# Patient Record
Sex: FEMALE | Race: WHITE | NOT HISPANIC OR LATINO | Employment: UNEMPLOYED | URBAN - NONMETROPOLITAN AREA
[De-identification: names, ages, dates, MRNs, and addresses within clinical notes are randomized per-mention and may not be internally consistent; named-entity substitution may affect disease eponyms.]

---

## 2018-09-06 ENCOUNTER — TRANSCRIBE ORDERS (OUTPATIENT)
Dept: ADMINISTRATIVE | Facility: HOSPITAL | Age: 78
End: 2018-09-06

## 2018-09-06 DIAGNOSIS — R13.11 DYSPHAGIA, ORAL PHASE: Primary | ICD-10-CM

## 2018-09-12 ENCOUNTER — HOSPITAL ENCOUNTER (OUTPATIENT)
Dept: GENERAL RADIOLOGY | Facility: HOSPITAL | Age: 78
Discharge: HOME | End: 2018-09-12
Attending: OTOLARYNGOLOGY | Admitting: OTOLARYNGOLOGY

## 2018-09-12 DIAGNOSIS — R13.11 DYSPHAGIA, ORAL PHASE: ICD-10-CM

## 2018-09-12 PROCEDURE — G8996 SWALLOW CURRENT STATUS: HCPCS

## 2018-09-12 PROCEDURE — G8997 SWALLOW GOAL STATUS: HCPCS

## 2018-09-12 PROCEDURE — 74230 X-RAY XM SWLNG FUNCJ C+: CPT

## 2018-09-12 PROCEDURE — 74230 X-RAY XM SWLNG FUNCJ C+: CPT | Performed by: RADIOLOGY

## 2018-09-12 PROCEDURE — G8998 SWALLOW D/C STATUS: HCPCS

## 2018-09-12 PROCEDURE — 92611 MOTION FLUOROSCOPY/SWALLOW: CPT

## 2018-09-12 NOTE — MBS/VFSS/FEES
Outpatient Speech Language Pathology   Adult Swallow Initial Evaluation   Ryan   OUTPATIENT MODIFIED BARIUM SWALLOW STUDY     Patient Name: Amanda Cruz  : 1940  MRN: [id number]  Today's Date: 2018       Visit Date: 2018   There is no problem list on file for this patient.     History reviewed. No pertinent past medical history.     No past surgical history on file.    Visit Dx:     ICD-10-CM ICD-9-CM   1. Dysphagia, oral phase R13.11 787.21     Amanda Cruz  presents to the radiology suite this am from her private residence to participate in an instrumental MBS to evaluate safety/efficacy of swallowing fnx, determine safest/least restrictive diet. She is accompanied by a friend/family member whom remains in the waiting area during this evaluation. Ms. Cruz reports globus sensation vs. retrosternal discomfort w/ po intake, particularly solids foods/meats. She states these occurrences are sometimes so severe that she elicits regurgitation to alleviate this. She indicates initial onset of this approximately  w/ recent progression of these events. She denies any recent h/o PNA/bronchitis.     PMH is significant for fall in  resulting in foot/leg fx s/p repair w/ subsequent loss of smell/taste associated w/ this fall as well. PMH is also significant for UTI 2018, DM, HTN, thyroid disease, anxiety, SAMUEL, hiatal hernia, s/p partial hysterectomy. She does report sporadic occurrence of oral lesions/sore throat, not evidenced today.     Social History: No known h/o tobacco, alcohol or illicit drug use.     Current diet of regular consistency, thin liquids.     No recent chest xray available for review.     Pt is observed on ra w/o complications.     Risks and benefits of the procedure are explained w/ pt verbalizing understanding/agreement to participate.     Pt is positioned upright and centered in a chair to accept multiple po presentations of solid cracker, puree, honey  thick, nectar thick, and thin liquids via spoon, cup and straw, along w/ whole placebo pill in puree. Pt is able to self feed.     All views are from the lateral plane.     Facial/oral structures are symmetrical upon observation w/o lingual deviation upon protrusion. Oral mucosa are moist, pink and clean. Secretions are clear, thin and well controlled. OROM/NATHALIE is wfl to imitate oral postures. She is edentulous w/ upper/lower dentures in place. Gag is not assessed. Volitional cough is adequate in intensity, clear in quality, nonproductive. Vocal quality is adequate in intensity, clear in quality w/ intelligible speech. Pt is a/a and cooperative to particpate. She is oriented to person, place, and time, follows simple directives, and participates in simple conversational exchanges.     Upon po presentations, adequate bolus anticipation w/ good labial seal for bolus clearance via spoon bowl, cup rim stability and suction via straw. Bolus formation, manipulation, and control are wfl w/ rotary mastication pattern. A-p transit is timely w/o oral residue. Tongue base retraction and linguavelar seal are adequate w/o premature spillage. No laryngeal penetration or aspiration is evidenced before the swallow.     Pharyngeal swallow is timely w/ adequate hyolaryngeal elevation and epiglottic inversion. Pharyngeal contraction is adequate w/o significant residue. No laryngeal penetration or aspiration evidenced during or after the swallow.     Pt is able to manipulate and swallow whole placebo pill in puree w/o difficulty, however, pill does temporarily lodge in the upper esophagus, clearing w/ consecutive puree presentation.     D/w radiologist in person findings of full esophageal sweep. Per this discussion, imaging reveals per radiologist a focal area of residue at C5-6 level for all consistencies w/ uncertain etiology uncertain, as well as a mild narrowing. Radiologist voices possibility of a lateral diverticulum vs.  esophageal stricture and recommends GI f/u. Motility otherwise appears adequate w/o obvious retrograde flow noted.      Impression: Per this evaluation, Ms. Monsivaisglia presents w/ wfl oropharyngeal swallow w/o laryngeal penetration or aspiration evidenced across this evaluation. Please consider pt candidacy for GI referral per findings of esophageal sweep and radiologist recommendation. See radiologist note for further details.     Recommendations:   1. Regular consistency diet, thin liquids.   2. Meds whole in puree/thins.   3. Small bites and sips.   4. Increased oral prep time.   5. Alternate solids and liquids.   6. SAMUEL precautions.   7. Oral care protocol.   8. Please consider pt candidacy for GI referral.     D/w pt at length results and recommendations w/ review of MBS images/videos w/ verbal understanding and agreement.     Thank you for allowing me to participate in the care of your patient-  Mari Maher M.A., CCC-SLP          OP SLP Education     Row Name 09/12/18 1056       Education    Barriers to Learning No barriers identified  -CM    Education Provided Described results of evaluation;Patient expressed understanding of evaluation  -CM    Assessed Learning motivation  -CM    Learning Motivation Strong  -CM    Learning Method Explanation  -CM    Teaching Response Verbalized understanding  -CM      User Key  (r) = Recorded By, (t) = Taken By, (c) = Cosigned By    Initials Name Effective Dates    CM Mari Maher MA,CCC-SLP 04/03/18 -         Time Calculation:      Therapy Charges for Today     Code Description Service Date Service Provider Modifiers Qty    81439769728 HC ST SWALLOWING CURRENT STATUS 9/12/2018 Mari Maher MA,CCC-SLP GN, [facility]    17253270394 HC ST SWALLOWING PROJECTED 9/12/2018 Mari Maher MA,CCC-SLP GN,  1    43362337017 HC ST SWALLOWING DISCHARGE 9/12/2018 Mari Maher MA,CCC-SLP GN,  1    32046876973 HC ST MOTION FLUORO EVAL  SWALLOW 8 9/12/2018 Mari Maher MA,CCC-SLP GN 1        SLP G-Codes  SLP NOMS Used?: Yes  Functional Limitations: Swallowing  Swallow Current Status (): 0 percent impaired, limited or restricted  Swallow Goal Status (): 0 percent impaired, limited or restricted  Swallow Discharge Status (): 0 percent impaired, limited or restricted        Mari Maher MA,CCC-SLP  9/12/2018

## 2018-09-13 ENCOUNTER — APPOINTMENT (OUTPATIENT)
Dept: GENERAL RADIOLOGY | Facility: HOSPITAL | Age: 78
End: 2018-09-13
Attending: OTOLARYNGOLOGY

## 2018-10-09 ENCOUNTER — OFFICE VISIT (OUTPATIENT)
Dept: GASTROENTEROLOGY | Facility: CLINIC | Age: 78
End: 2018-10-09

## 2018-10-09 VITALS
HEIGHT: 64 IN | SYSTOLIC BLOOD PRESSURE: 150 MMHG | BODY MASS INDEX: 25.81 KG/M2 | HEART RATE: 48 BPM | DIASTOLIC BLOOD PRESSURE: 62 MMHG | WEIGHT: 151.2 LBS | OXYGEN SATURATION: 97 %

## 2018-10-09 DIAGNOSIS — K21.9 GASTROESOPHAGEAL REFLUX DISEASE, ESOPHAGITIS PRESENCE NOT SPECIFIED: Primary | ICD-10-CM

## 2018-10-09 DIAGNOSIS — R13.10 ABNORMAL SWALLOWING: ICD-10-CM

## 2018-10-09 DIAGNOSIS — L98.9 SKIN LESION: ICD-10-CM

## 2018-10-09 DIAGNOSIS — R13.10 DYSPHAGIA, UNSPECIFIED TYPE: ICD-10-CM

## 2018-10-09 PROCEDURE — 99204 OFFICE O/P NEW MOD 45 MIN: CPT | Performed by: PHYSICIAN ASSISTANT

## 2018-10-09 RX ORDER — FLUTICASONE PROPIONATE 50 MCG
1 SPRAY, SUSPENSION (ML) NASAL 2 TIMES DAILY
COMMUNITY

## 2018-10-09 RX ORDER — ASPIRIN 81 MG/1
81 TABLET ORAL DAILY
COMMUNITY

## 2018-10-09 RX ORDER — GLIPIZIDE 5 MG/1
5 TABLET ORAL DAILY
COMMUNITY

## 2018-10-09 RX ORDER — PAROXETINE 10 MG/1
10 TABLET, FILM COATED ORAL EVERY MORNING
COMMUNITY

## 2018-10-09 RX ORDER — PANTOPRAZOLE SODIUM 40 MG/1
40 TABLET, DELAYED RELEASE ORAL DAILY
Qty: 30 TABLET | Refills: 1 | Status: SHIPPED | OUTPATIENT
Start: 2018-10-09 | End: 2018-11-20 | Stop reason: SDUPTHER

## 2018-10-09 RX ORDER — CLONAZEPAM 0.5 MG/1
0.5 TABLET ORAL DAILY
COMMUNITY
End: 2019-11-21

## 2018-10-09 RX ORDER — ATORVASTATIN CALCIUM 10 MG/1
10 TABLET, FILM COATED ORAL DAILY
COMMUNITY

## 2018-10-09 RX ORDER — LEVOTHYROXINE SODIUM 0.07 MG/1
75 TABLET ORAL DAILY
COMMUNITY

## 2018-10-09 RX ORDER — RANITIDINE 150 MG/1
150 TABLET ORAL DAILY
COMMUNITY

## 2018-10-09 NOTE — PROGRESS NOTES
": 1940    Chief Complaint   Patient presents with   • Difficulty Swallowing       Amanda Cruz is a 77 y.o. female who presents to the office today as a new patient for evaluation of Difficulty Swallowing.    History of Present Illness:  She has had loss of smell and taste since the . She had been seeing ENT regarding this problem. She had swallow study which had abnormal findings per her report. She has noticed that she has difficulty with swallowing and points to the bottom of her neck as the area of the discomfort. She also feels that at times, things are \"stuck\" there and points to her mid-chest. She has difficulty with chips, meats and breads. She has had dysphagia which has gradually worsened and became more frequent with regurgitation and even vomiting over the past 5-6 months. She takes Zantac 150 mg once daily. Heartburn is occassional. Nausea is intermittent. No first degree family history of stomach or esophageal cancer. Maternal uncle had esophageal cancer. Last colonoscopy was  which was normal performed by Dr. Santos. No prior history of EGD.     Has skin lesions that she is worried about, 1 on neck, 1 on right side, 1 on back and others that she would like checked. No history of heart murmur. Reports fatigue and \"feels tired in chest.\"    2018 swallow eval:  FINDINGS: There was no penetration or aspiration during the study.  Patient protected her airway adequately.  Focal area of bullous retention at C5-6 level. Etiology is uncertain.  This could represent a lateral diverticulum. Mild narrowing also noted.  I would consider GI follow-up.  FLUOROSCOPY TIME: 1.7 minutes.  IMAGES: Cine loop was acquired.  IMPRESSION:  No evidence of aspiration.    Review of Systems   Constitutional: Positive for chills, fatigue and fever.   HENT: Positive for trouble swallowing.    Eyes: Negative.    Respiratory: Positive for cough and choking. Negative for chest tightness and shortness of " breath.    Cardiovascular: Negative for chest pain.   Gastrointestinal: Positive for abdominal distention, nausea and vomiting. Negative for abdominal pain, anal bleeding, blood in stool, constipation and diarrhea.   Endocrine: Negative.    Genitourinary: Positive for frequency and urgency.   Musculoskeletal: Positive for back pain.   Skin: Negative for color change, rash and wound.   Allergic/Immunologic: Negative for environmental allergies and food allergies.   Neurological: Positive for dizziness and light-headedness. Negative for headaches.   Hematological: Does not bruise/bleed easily.   Psychiatric/Behavioral: The patient is nervous/anxious.        Past Medical History:   Diagnosis Date   • Diabetes mellitus (CMS/HCC)    • Hyperlipidemia    • Smell and taste disorder        Past Surgical History:   Procedure Laterality Date   • ANKLE SURGERY     • COLONOSCOPY     • HYSTERECTOMY      partial       Family History   Problem Relation Age of Onset   • Cancer Mother    • Alzheimer's disease Mother    • Uterine cancer Mother    • Uterine cancer Maternal Grandmother        Social History     Social History   • Marital status:      Social History Main Topics   • Smoking status: Never Smoker   • Smokeless tobacco: Never Used   • Alcohol use No   • Drug use: No   • Sexual activity: Defer     Other Topics Concern   • Not on file       Current Outpatient Prescriptions:   •  aspirin 81 MG EC tablet, Take 81 mg by mouth Daily., Disp: , Rfl:   •  atorvastatin (LIPITOR) 10 MG tablet, Take 10 mg by mouth Daily., Disp: , Rfl:   •  clonazePAM (KlonoPIN) 0.5 MG tablet, Take 0.5 mg by mouth Daily., Disp: , Rfl:   •  fluticasone (FLONASE) 50 MCG/ACT nasal spray, 1 spray into the nostril(s) as directed by provider 2 (Two) Times a Day., Disp: , Rfl:   •  glipiZIDE (GLUCOTROL) 5 MG tablet, Take 5 mg by mouth Daily., Disp: , Rfl:   •  levothyroxine (SYNTHROID, LEVOTHROID) 75 MCG tablet, Take 75 mcg by mouth Daily., Disp: , Rfl:  "  •  PARoxetine (PAXIL) 10 MG tablet, Take 10 mg by mouth Every Morning., Disp: , Rfl:   •  raNITIdine (ZANTAC) 150 MG tablet, Take 150 mg by mouth Daily., Disp: , Rfl:     Allergies:   Rocephin [ceftriaxone]    Vitals:  /62 (BP Location: Right arm, Patient Position: Sitting, Cuff Size: Adult)   Pulse (!) 48   Ht 162.6 cm (64\")   Wt 68.6 kg (151 lb 3.2 oz)   SpO2 97%   BMI 25.95 kg/m²     Physical Exam   Constitutional: She is oriented to person, place, and time. She appears well-developed and well-nourished. No distress.   HENT:   Head: Normocephalic and atraumatic.   Nose: Nose normal.   Mouth/Throat: Oropharynx is clear and moist.   Eyes: Conjunctivae are normal. Right eye exhibits no discharge. Left eye exhibits no discharge. No scleral icterus.   Neck: Normal range of motion. No JVD present.   Cardiovascular: Normal rate and regular rhythm.  Exam reveals no gallop and no friction rub.    Murmur heard.  Pulmonary/Chest: Effort normal and breath sounds normal. No respiratory distress. She has no wheezes. She has no rales. She exhibits no tenderness.   Abdominal: Soft. Bowel sounds are normal. She exhibits no mass. There is no tenderness.   Musculoskeletal: Normal range of motion. She exhibits no edema or deformity.   Neurological: She is alert and oriented to person, place, and time. Coordination normal.   Skin: Skin is warm and dry. No rash noted. She is not diaphoretic. No erythema.   Seborrheic keratosis right thoracic region   Psychiatric: She has a normal mood and affect. Her behavior is normal. Judgment and thought content normal.   Vitals reviewed.      Assessment/Plan:  1. Gastroesophageal reflux disease, esophagitis presence not specified    2. Dysphagia, unspecified type    3. Abnormal swallowing    4. Skin lesion      Orders Placed This Encounter   Procedures   • Ambulatory Referral to Dermatology   • Follow Anesthesia Guidelines / Standing Orders     ESOPHAGOGASTRODUODENOSCOPY WITH BIOPSY " CPT CODE: 01433 (N/A)  She will need an esophagogastroduodenoscopy performed with IV general sedation. All of the risks, benefits and alternatives of this procedure have been discussed with her, all of her questions have been answered and she has elected to proceed. She should follow up in the office after this procedure to discuss the results and further recommendations can be made at that time.    New Medications Ordered This Visit   Medications   • pantoprazole (PROTONIX) 40 MG EC tablet     Sig: Take 1 tablet by mouth Daily.     Dispense:  30 tablet     Refill:  1     I have asked her to start taking a PPI (Protonix 40 mg once daily) as an effort to decrease episodes of dysphagia if related to GERD. She will have EGD for further evaluation of abnormalities mentioned on previous swallow study. Narrowing was mentioned as well as possible esophageal diverticulum.     Referral to dermatology was placed due to patient's concern for skin lesions. She will also consider cardiac evaluation due to new heart murmur and complaints of fatigue. She plans to consider this then discuss with PCP.         Return for follow up after procedure to discuss results.      Electronically signed 10/9/2018 12:32 PM  Jania Lucero PA-C, [facility]

## 2018-10-15 PROBLEM — R13.10 ABNORMAL SWALLOWING: Status: ACTIVE | Noted: 2018-10-15

## 2018-10-15 PROBLEM — K21.9 GASTROESOPHAGEAL REFLUX DISEASE: Status: ACTIVE | Noted: 2018-10-15

## 2018-10-15 PROBLEM — R13.10 DYSPHAGIA: Status: ACTIVE | Noted: 2018-10-15

## 2018-10-24 ENCOUNTER — ANESTHESIA (OUTPATIENT)
Dept: PERIOP | Facility: HOSPITAL | Age: 78
End: 2018-10-24

## 2018-10-24 ENCOUNTER — HOSPITAL ENCOUNTER (OUTPATIENT)
Facility: HOSPITAL | Age: 78
Setting detail: HOSPITAL OUTPATIENT SURGERY
Discharge: HOME | End: 2018-10-24
Attending: SURGERY | Admitting: ANESTHESIOLOGY

## 2018-10-24 ENCOUNTER — ANESTHESIA EVENT (OUTPATIENT)
Dept: PERIOP | Facility: HOSPITAL | Age: 78
End: 2018-10-24

## 2018-10-24 VITALS
WEIGHT: 153 LBS | HEART RATE: 50 BPM | RESPIRATION RATE: 18 BRPM | TEMPERATURE: 98.2 F | OXYGEN SATURATION: 97 % | BODY MASS INDEX: 25.49 KG/M2 | DIASTOLIC BLOOD PRESSURE: 64 MMHG | HEIGHT: 65 IN | SYSTOLIC BLOOD PRESSURE: 136 MMHG

## 2018-10-24 DIAGNOSIS — R13.10 ABNORMAL SWALLOWING: ICD-10-CM

## 2018-10-24 DIAGNOSIS — R13.10 DYSPHAGIA, UNSPECIFIED TYPE: ICD-10-CM

## 2018-10-24 DIAGNOSIS — K21.9 GASTROESOPHAGEAL REFLUX DISEASE, ESOPHAGITIS PRESENCE NOT SPECIFIED: ICD-10-CM

## 2018-10-24 LAB — GLUCOSE BLDC GLUCOMTR-MCNC: 123 MG/DL (ref 70–130)

## 2018-10-24 PROCEDURE — 82962 GLUCOSE BLOOD TEST: CPT

## 2018-10-24 PROCEDURE — 43239 EGD BIOPSY SINGLE/MULTIPLE: CPT | Performed by: SURGERY

## 2018-10-24 PROCEDURE — 25010000002 PROPOFOL 10 MG/ML EMULSION: Performed by: NURSE ANESTHETIST, CERTIFIED REGISTERED

## 2018-10-24 PROCEDURE — 88305 TISSUE EXAM BY PATHOLOGIST: CPT | Performed by: SURGERY

## 2018-10-24 RX ORDER — SODIUM CHLORIDE 0.9 % (FLUSH) 0.9 %
3-10 SYRINGE (ML) INJECTION AS NEEDED
Status: DISCONTINUED | OUTPATIENT
Start: 2018-10-24 | End: 2018-10-24 | Stop reason: HOSPADM

## 2018-10-24 RX ORDER — ONDANSETRON 2 MG/ML
4 INJECTION INTRAMUSCULAR; INTRAVENOUS ONCE AS NEEDED
Status: DISCONTINUED | OUTPATIENT
Start: 2018-10-24 | End: 2018-10-24 | Stop reason: HOSPADM

## 2018-10-24 RX ORDER — SODIUM CHLORIDE 0.9 % (FLUSH) 0.9 %
3 SYRINGE (ML) INJECTION EVERY 12 HOURS SCHEDULED
Status: DISCONTINUED | OUTPATIENT
Start: 2018-10-24 | End: 2018-10-24 | Stop reason: HOSPADM

## 2018-10-24 RX ORDER — SULFAMETHOXAZOLE AND TRIMETHOPRIM 800; 160 MG/1; MG/1
1 TABLET ORAL 2 TIMES DAILY
COMMUNITY
End: 2019-11-21

## 2018-10-24 RX ORDER — PROPOFOL 10 MG/ML
VIAL (ML) INTRAVENOUS AS NEEDED
Status: DISCONTINUED | OUTPATIENT
Start: 2018-10-24 | End: 2018-10-24 | Stop reason: SURG

## 2018-10-24 RX ORDER — LOSARTAN POTASSIUM 100 MG/1
100 TABLET ORAL DAILY
COMMUNITY

## 2018-10-24 RX ORDER — PREDNISONE 10 MG/1
10 TABLET ORAL DAILY
COMMUNITY
End: 2018-11-20

## 2018-10-24 RX ORDER — SODIUM CHLORIDE, SODIUM LACTATE, POTASSIUM CHLORIDE, CALCIUM CHLORIDE 600; 310; 30; 20 MG/100ML; MG/100ML; MG/100ML; MG/100ML
125 INJECTION, SOLUTION INTRAVENOUS CONTINUOUS
Status: DISCONTINUED | OUTPATIENT
Start: 2018-10-24 | End: 2018-10-24 | Stop reason: HOSPADM

## 2018-10-24 RX ORDER — IPRATROPIUM BROMIDE AND ALBUTEROL SULFATE 2.5; .5 MG/3ML; MG/3ML
3 SOLUTION RESPIRATORY (INHALATION) ONCE AS NEEDED
Status: DISCONTINUED | OUTPATIENT
Start: 2018-10-24 | End: 2018-10-24 | Stop reason: HOSPADM

## 2018-10-24 RX ADMIN — SODIUM CHLORIDE, POTASSIUM CHLORIDE, SODIUM LACTATE AND CALCIUM CHLORIDE: 600; 310; 30; 20 INJECTION, SOLUTION INTRAVENOUS at 08:20

## 2018-10-24 RX ADMIN — PROPOFOL 100 MG: 10 INJECTION, EMULSION INTRAVENOUS at 08:24

## 2018-10-24 NOTE — H&P (VIEW-ONLY)
": 1940    Chief Complaint   Patient presents with   • Difficulty Swallowing       Amanda Cruz is a 77 y.o. female who presents to the office today as a new patient for evaluation of Difficulty Swallowing.    History of Present Illness:  She has had loss of smell and taste since the . She had been seeing ENT regarding this problem. She had swallow study which had abnormal findings per her report. She has noticed that she has difficulty with swallowing and points to the bottom of her neck as the area of the discomfort. She also feels that at times, things are \"stuck\" there and points to her mid-chest. She has difficulty with chips, meats and breads. She has had dysphagia which has gradually worsened and became more frequent with regurgitation and even vomiting over the past 5-6 months. She takes Zantac 150 mg once daily. Heartburn is occassional. Nausea is intermittent. No first degree family history of stomach or esophageal cancer. Maternal uncle had esophageal cancer. Last colonoscopy was  which was normal performed by Dr. Santos. No prior history of EGD.     Has skin lesions that she is worried about, 1 on neck, 1 on right side, 1 on back and others that she would like checked. No history of heart murmur. Reports fatigue and \"feels tired in chest.\"    2018 swallow eval:  FINDINGS: There was no penetration or aspiration during the study.  Patient protected her airway adequately.  Focal area of bullous retention at C5-6 level. Etiology is uncertain.  This could represent a lateral diverticulum. Mild narrowing also noted.  I would consider GI follow-up.  FLUOROSCOPY TIME: 1.7 minutes.  IMAGES: Cine loop was acquired.  IMPRESSION:  No evidence of aspiration.    Review of Systems   Constitutional: Positive for chills, fatigue and fever.   HENT: Positive for trouble swallowing.    Eyes: Negative.    Respiratory: Positive for cough and choking. Negative for chest tightness and shortness of " breath.    Cardiovascular: Negative for chest pain.   Gastrointestinal: Positive for abdominal distention, nausea and vomiting. Negative for abdominal pain, anal bleeding, blood in stool, constipation and diarrhea.   Endocrine: Negative.    Genitourinary: Positive for frequency and urgency.   Musculoskeletal: Positive for back pain.   Skin: Negative for color change, rash and wound.   Allergic/Immunologic: Negative for environmental allergies and food allergies.   Neurological: Positive for dizziness and light-headedness. Negative for headaches.   Hematological: Does not bruise/bleed easily.   Psychiatric/Behavioral: The patient is nervous/anxious.        Past Medical History:   Diagnosis Date   • Diabetes mellitus (CMS/HCC)    • Hyperlipidemia    • Smell and taste disorder        Past Surgical History:   Procedure Laterality Date   • ANKLE SURGERY     • COLONOSCOPY     • HYSTERECTOMY      partial       Family History   Problem Relation Age of Onset   • Cancer Mother    • Alzheimer's disease Mother    • Uterine cancer Mother    • Uterine cancer Maternal Grandmother        Social History     Social History   • Marital status:      Social History Main Topics   • Smoking status: Never Smoker   • Smokeless tobacco: Never Used   • Alcohol use No   • Drug use: No   • Sexual activity: Defer     Other Topics Concern   • Not on file       Current Outpatient Prescriptions:   •  aspirin 81 MG EC tablet, Take 81 mg by mouth Daily., Disp: , Rfl:   •  atorvastatin (LIPITOR) 10 MG tablet, Take 10 mg by mouth Daily., Disp: , Rfl:   •  clonazePAM (KlonoPIN) 0.5 MG tablet, Take 0.5 mg by mouth Daily., Disp: , Rfl:   •  fluticasone (FLONASE) 50 MCG/ACT nasal spray, 1 spray into the nostril(s) as directed by provider 2 (Two) Times a Day., Disp: , Rfl:   •  glipiZIDE (GLUCOTROL) 5 MG tablet, Take 5 mg by mouth Daily., Disp: , Rfl:   •  levothyroxine (SYNTHROID, LEVOTHROID) 75 MCG tablet, Take 75 mcg by mouth Daily., Disp: , Rfl:  "  •  PARoxetine (PAXIL) 10 MG tablet, Take 10 mg by mouth Every Morning., Disp: , Rfl:   •  raNITIdine (ZANTAC) 150 MG tablet, Take 150 mg by mouth Daily., Disp: , Rfl:     Allergies:   Rocephin [ceftriaxone]    Vitals:  /62 (BP Location: Right arm, Patient Position: Sitting, Cuff Size: Adult)   Pulse (!) 48   Ht 162.6 cm (64\")   Wt 68.6 kg (151 lb 3.2 oz)   SpO2 97%   BMI 25.95 kg/m²     Physical Exam   Constitutional: She is oriented to person, place, and time. She appears well-developed and well-nourished. No distress.   HENT:   Head: Normocephalic and atraumatic.   Nose: Nose normal.   Mouth/Throat: Oropharynx is clear and moist.   Eyes: Conjunctivae are normal. Right eye exhibits no discharge. Left eye exhibits no discharge. No scleral icterus.   Neck: Normal range of motion. No JVD present.   Cardiovascular: Normal rate and regular rhythm.  Exam reveals no gallop and no friction rub.    Murmur heard.  Pulmonary/Chest: Effort normal and breath sounds normal. No respiratory distress. She has no wheezes. She has no rales. She exhibits no tenderness.   Abdominal: Soft. Bowel sounds are normal. She exhibits no mass. There is no tenderness.   Musculoskeletal: Normal range of motion. She exhibits no edema or deformity.   Neurological: She is alert and oriented to person, place, and time. Coordination normal.   Skin: Skin is warm and dry. No rash noted. She is not diaphoretic. No erythema.   Seborrheic keratosis right thoracic region   Psychiatric: She has a normal mood and affect. Her behavior is normal. Judgment and thought content normal.   Vitals reviewed.      Assessment/Plan:  1. Gastroesophageal reflux disease, esophagitis presence not specified    2. Dysphagia, unspecified type    3. Abnormal swallowing    4. Skin lesion      Orders Placed This Encounter   Procedures   • Ambulatory Referral to Dermatology   • Follow Anesthesia Guidelines / Standing Orders     ESOPHAGOGASTRODUODENOSCOPY WITH BIOPSY " CPT CODE: 67587 (N/A)  She will need an esophagogastroduodenoscopy performed with IV general sedation. All of the risks, benefits and alternatives of this procedure have been discussed with her, all of her questions have been answered and she has elected to proceed. She should follow up in the office after this procedure to discuss the results and further recommendations can be made at that time.    New Medications Ordered This Visit   Medications   • pantoprazole (PROTONIX) 40 MG EC tablet     Sig: Take 1 tablet by mouth Daily.     Dispense:  30 tablet     Refill:  1     I have asked her to start taking a PPI (Protonix 40 mg once daily) as an effort to decrease episodes of dysphagia if related to GERD. She will have EGD for further evaluation of abnormalities mentioned on previous swallow study. Narrowing was mentioned as well as possible esophageal diverticulum.     Referral to dermatology was placed due to patient's concern for skin lesions. She will also consider cardiac evaluation due to new heart murmur and complaints of fatigue. She plans to consider this then discuss with PCP.         Return for follow up after procedure to discuss results.      Electronically signed 10/9/2018 12:32 PM  Jania Lucero PA-C, [facility]

## 2018-10-24 NOTE — ANESTHESIA POSTPROCEDURE EVALUATION
Patient: Amanda Cruz    Procedure Summary     Date:  10/24/18 Room / Location:  [facility] OR [address] [place] [address] COR OR    Anesthesia Start:  0820 Anesthesia Stop:  0830    Procedure:  ESOPHAGOGASTRODUODENOSCOPY WITH BIOPSY CPT CODE: 64102 (N/A Esophagus) Diagnosis:       Gastroesophageal reflux disease, esophagitis presence not specified      Dysphagia, unspecified type      Abnormal swallowing      (Gastroesophageal reflux disease, esophagitis presence not specified [K21.9])      (Dysphagia, unspecified type [R13.10])      (Abnormal swallowing [R13.10])    Surgeon:  Mark Hardin MD Provider:  Wes Mendoza MD    Anesthesia Type:  general ASA Status:  3          Anesthesia Type: No value filed.  Last vitals  BP   112/42 (10/24/18 0832)   Temp   98.2 °F (36.8 °C) (10/24/18 0832)   Pulse   54 (10/24/18 0832)   Resp   20 (10/24/18 0832)     SpO2   97 % (10/24/18 0832)     Post Anesthesia Care and Evaluation    Patient location during evaluation: PHASE II  Patient participation: complete - patient participated  Level of consciousness: awake and alert  Pain score: 1  Pain management: adequate  Airway patency: patent  Anesthetic complications: No anesthetic complications  PONV Status: controlled  Cardiovascular status: acceptable  Respiratory status: acceptable  Hydration status: acceptable

## 2018-10-24 NOTE — ANESTHESIA PREPROCEDURE EVALUATION
Anesthesia Evaluation     Patient summary reviewed and Nursing notes reviewed   no history of anesthetic complications:  NPO Solid Status: > 8 hours  NPO Liquid Status: > 8 hours           Airway   Mallampati: II  TM distance: >3 FB  Neck ROM: full  no difficulty expected  Dental - normal exam   (+) edentulous, lower dentures and upper dentures    Pulmonary - negative pulmonary ROS and normal exam   (-) asthma, not a smoker  Cardiovascular - normal exam  Exercise tolerance: good (4-7 METS)    NYHA Classification: II    (+) hypertension, valvular problems/murmurs murmur, dysrhythmias, hyperlipidemia,   (-) past MI, angina, CHF      Neuro/Psych  (+) psychiatric history Anxiety and Depression,     (-) seizures, CVA  GI/Hepatic/Renal/Endo    (+)  GERD,  diabetes mellitus, hypothyroidism,   (-) liver disease, no renal disease    Musculoskeletal     Abdominal  - normal exam    Bowel sounds: normal.   Substance History - negative use     OB/GYN negative ob/gyn ROS         Other   (+) arthritis                     Anesthesia Plan    ASA 3     general     intravenous induction   Anesthetic plan, all risks, benefits, and alternatives have been provided, discussed and informed consent has been obtained with: patient.  Use of blood products discussed with patient  Consented to blood products.

## 2018-10-24 NOTE — OP NOTE
ESOPHAGOGASTRODUODENOSCOPY WITH BIOPSY  Procedure Note    Amanda Cruz  10/24/2018    Pre-op Diagnosis:   Gastroesophageal reflux disease, esophagitis presence not specified [K21.9]  Dysphagia, unspecified type [R13.10]  Abnormal swallowing [R13.10]    Post-op Diagnosis:   Gastritis    Indications: Dysphagia, she has been initiated on PPI therapy and has had some improvement in her symptoms.    Procedure(s):  ESOPHAGOGASTRODUODENOSCOPY WITH BIOPSY CPT CODE: 03025    Surgeon(s):  Mark Hardin MD    Anesthesia: General    Staff:   Circulator: Jayde Barnes RN  Endo Technician: Kobi Mark    Findings: Mild gastritis, no evidence of stricture stenosis or mass causing dysphasia    Operative Procedure: The patient was taken operating suite and placed in left lateral decubitus position.  Bilateral sequential compression devices were in place and IV anesthesia was administered.  Timeout procedure was performed.  The endoscope was inserted into the oropharynx and the esophagus was intubated.  The scope was advanced to the third portion of the duodenum.  The scope was slowly withdrawn evaluating all mucosal areas.  The duodenum and gastric body were evaluated and there was mild antral gastritis.  Biopsy forceps were used to sample the area.  No ulcers masses or other abnormalities were noted.  Retroflexion showed no evidence of hiatal hernia.  The scope was withdrawn and the GE junction was within normal limits as was the remainder the esophageal mucosa.  The scope advanced easily throughout the entirety of the procedure with no evidence of stricture or source for the patient's dysphasia.  The remainder of the esophageal mucosa was within normal limits and the scope was removed and the patient awakened from anesthesia and taken to recovery.    Estimated Blood Loss: minimal    Specimens:   Antral biopsy                Drains: none    Grafts or Implants: none    Complications: none    Recommendations:  Continue PPI therapy    Mark Hardin MD     Date: 10/24/2018  Time: 8:37 AM

## 2018-10-25 LAB
LAB AP CASE REPORT: NORMAL
PATH REPORT.FINAL DX SPEC: NORMAL

## 2018-11-20 ENCOUNTER — OFFICE VISIT (OUTPATIENT)
Dept: GASTROENTEROLOGY | Facility: CLINIC | Age: 78
End: 2018-11-20

## 2018-11-20 VITALS
BODY MASS INDEX: 26.29 KG/M2 | HEIGHT: 64 IN | WEIGHT: 154 LBS | DIASTOLIC BLOOD PRESSURE: 71 MMHG | OXYGEN SATURATION: 95 % | HEART RATE: 76 BPM | SYSTOLIC BLOOD PRESSURE: 141 MMHG

## 2018-11-20 DIAGNOSIS — K21.9 GASTROESOPHAGEAL REFLUX DISEASE, ESOPHAGITIS PRESENCE NOT SPECIFIED: Primary | ICD-10-CM

## 2018-11-20 DIAGNOSIS — R13.19 ESOPHAGEAL DYSPHAGIA: ICD-10-CM

## 2018-11-20 PROCEDURE — 99213 OFFICE O/P EST LOW 20 MIN: CPT | Performed by: PHYSICIAN ASSISTANT

## 2018-11-20 RX ORDER — PANTOPRAZOLE SODIUM 20 MG/1
20 TABLET, DELAYED RELEASE ORAL
Qty: 30 TABLET | Refills: 5 | Status: SHIPPED | OUTPATIENT
Start: 2018-11-20 | End: 2019-11-21

## 2018-11-20 NOTE — PROGRESS NOTES
: 1940    Chief Complaint   Patient presents with   • Difficulty Swallowing       Amanda Cruz is a 77 y.o. female who presents to the office today as a follow up appointment regarding dysphagia.     History of Present Illness:  She has been using Flonase plus taking Protonix 40 mg once daily since her last appt. She feels that this regimen has been helping dramatically with symptoms. She has not been having dysphagia as previously reported. She does not want to see a cardiologist at the moment because she is tired of having tests. She did not see dermatologist due to reported skin lesion. She was not aware that she had a heart murmur until last visit.     EGD by Dr. Hardin on 10/24/2018: Mild gastritis, no evidence of stricture stenosis or mass causing dysphasia. Pathology showed only mild chronic gastritis, no dysplasia or H pylori.     2018 swallow eval:  FINDINGS: There was no penetration or aspiration during the study.  Patient protected her airway adequately.  Focal area of bullous retention at C5-6 level. Etiology is uncertain.  This could represent a lateral diverticulum. Mild narrowing also noted.  I would consider GI follow-up.  FLUOROSCOPY TIME: 1.7 minutes.  IMAGES: Cine loop was acquired.  IMPRESSION:  No evidence of aspiration.    Review of Systems   Constitutional: Positive for fatigue. Negative for chills and fever.   HENT: Negative for trouble swallowing.    Eyes: Negative.    Respiratory: Positive for cough and choking. Negative for chest tightness and shortness of breath.    Cardiovascular: Negative for chest pain.   Gastrointestinal: Positive for diarrhea. Negative for abdominal distention, abdominal pain, anal bleeding, blood in stool, constipation, nausea and vomiting.   Endocrine: Negative.    Genitourinary: Positive for frequency and urgency.   Musculoskeletal: Positive for back pain.   Skin: Negative for color change, pallor, rash and wound.   Allergic/Immunologic: Negative  for environmental allergies and food allergies.   Neurological: Positive for dizziness and light-headedness. Negative for headaches.   Hematological: Does not bruise/bleed easily.   Psychiatric/Behavioral: The patient is nervous/anxious.        Past Medical History:   Diagnosis Date   • Arthritis    • Diabetes mellitus (CMS/HCC)    • Disease of thyroid gland    • Elevated cholesterol    • GERD (gastroesophageal reflux disease)    • Hyperlipidemia    • Hypertension    • Smell and taste disorder        Past Surgical History:   Procedure Laterality Date   • ANKLE SURGERY     • COLONOSCOPY     • HYSTERECTOMY      partial       Family History   Problem Relation Age of Onset   • Cancer Mother    • Alzheimer's disease Mother    • Uterine cancer Mother    • Uterine cancer Maternal Grandmother        Social History     Socioeconomic History   • Marital status:      Spouse name: Not on file   • Number of children: Not on file   • Years of education: Not on file   • Highest education level: Not on file   Tobacco Use   • Smoking status: Never Smoker   • Smokeless tobacco: Never Used   Substance and Sexual Activity   • Alcohol use: No   • Drug use: No   • Sexual activity: Defer       Current Outpatient Medications:   •  aspirin 81 MG EC tablet, Take 81 mg by mouth Daily., Disp: , Rfl:   •  atorvastatin (LIPITOR) 10 MG tablet, Take 10 mg by mouth Daily., Disp: , Rfl:   •  clonazePAM (KlonoPIN) 0.5 MG tablet, Take 0.5 mg by mouth Daily., Disp: , Rfl:   •  fluticasone (FLONASE) 50 MCG/ACT nasal spray, 1 spray into the nostril(s) as directed by provider 2 (Two) Times a Day., Disp: , Rfl:   •  glipiZIDE (GLUCOTROL) 5 MG tablet, Take 5 mg by mouth Daily., Disp: , Rfl:   •  levothyroxine (SYNTHROID, LEVOTHROID) 75 MCG tablet, Take 75 mcg by mouth Daily., Disp: , Rfl:   •  losartan (COZAAR) 100 MG tablet, Take 100 mg by mouth Daily., Disp: , Rfl:   •  pantoprazole (PROTONIX) 20 MG EC tablet, Take 1 tablet by mouth Every Morning  "Before Breakfast., Disp: 30 tablet, Rfl: 5  •  PARoxetine (PAXIL) 10 MG tablet, Take 10 mg by mouth Every Morning., Disp: , Rfl:   •  raNITIdine (ZANTAC) 150 MG tablet, Take 150 mg by mouth Daily., Disp: , Rfl:   •  sulfamethoxazole-trimethoprim (BACTRIM DS,SEPTRA DS) 800-160 MG per tablet, Take 1 tablet by mouth 2 (Two) Times a Day., Disp: , Rfl:     Allergies:   Ancef [cefazolin] and Rocephin [ceftriaxone]    Vitals:  /71 (BP Location: Right arm, Patient Position: Sitting, Cuff Size: Adult)   Pulse 76   Ht 162.6 cm (64\")   Wt 69.9 kg (154 lb)   SpO2 95%   BMI 26.43 kg/m²     Physical Exam   Constitutional: She is oriented to person, place, and time. She appears well-developed and well-nourished. No distress.   HENT:   Head: Normocephalic and atraumatic.   Nose: Nose normal.   Mouth/Throat: Oropharynx is clear and moist.   Eyes: Conjunctivae are normal. Right eye exhibits no discharge. Left eye exhibits no discharge. No scleral icterus.   Neck: Normal range of motion. No JVD present.   Cardiovascular: Normal rate and regular rhythm. Exam reveals no gallop and no friction rub.   Murmur heard.  Pulmonary/Chest: Effort normal and breath sounds normal. No respiratory distress. She has no wheezes. She has no rales. She exhibits no tenderness.   Abdominal: Soft. Bowel sounds are normal. She exhibits no mass. There is tenderness (generalized, mild).   Musculoskeletal: Normal range of motion. She exhibits no edema or deformity.   Neurological: She is alert and oriented to person, place, and time. Coordination normal.   Skin: Skin is warm and dry. No rash noted. She is not diaphoretic. No erythema.   Seborrheic keratosis right thoracic region   Psychiatric: She has a normal mood and affect. Her behavior is normal. Judgment and thought content normal.   Vitals reviewed.      Assessment/Plan:  1. Gastroesophageal reflux disease, esophagitis presence not specified    2. Esophageal dysphagia      We discussed her " results in detail. She will continue with PPI but we will decrease the dose to Protonix 20 mg once daily which would be better for long term use. She will take this medication 30 minutes before a meal. Continue with Flonase regimen. EGD did not reveal diverticulum or narrowing as suspected on swallow study. Her symptoms have resolved. She will call with concerns.     New Medications Ordered This Visit   Medications   • pantoprazole (PROTONIX) 20 MG EC tablet     Sig: Take 1 tablet by mouth Every Morning Before Breakfast.     Dispense:  30 tablet     Refill:  5           Return if symptoms worsen or fail to improve.      Electronically signed 11/20/2018 4:27 PM  Jania Lucero PA-C, [facility]

## 2019-11-21 ENCOUNTER — OFFICE VISIT (OUTPATIENT)
Dept: UROLOGY | Facility: CLINIC | Age: 79
End: 2019-11-21

## 2019-11-21 VITALS
BODY MASS INDEX: 24.32 KG/M2 | DIASTOLIC BLOOD PRESSURE: 62 MMHG | HEIGHT: 65 IN | WEIGHT: 146 LBS | SYSTOLIC BLOOD PRESSURE: 150 MMHG

## 2019-11-21 DIAGNOSIS — N32.81 DETRUSOR INSTABILITY: ICD-10-CM

## 2019-11-21 DIAGNOSIS — R33.9 INCOMPLETE BLADDER EMPTYING: ICD-10-CM

## 2019-11-21 DIAGNOSIS — N39.46 MIXED STRESS AND URGE URINARY INCONTINENCE: ICD-10-CM

## 2019-11-21 DIAGNOSIS — R30.0 DYSURIA: Primary | ICD-10-CM

## 2019-11-21 LAB
BILIRUB BLD-MCNC: NEGATIVE MG/DL
CLARITY, POC: CLEAR
COLOR UR: YELLOW
GLUCOSE UR STRIP-MCNC: NEGATIVE MG/DL
KETONES UR QL: NEGATIVE
LEUKOCYTE EST, POC: NEGATIVE
NITRITE UR-MCNC: NEGATIVE MG/ML
PH UR: 6 [PH] (ref 5–8)
PROT UR STRIP-MCNC: NEGATIVE MG/DL
RBC # UR STRIP: NEGATIVE /UL
SP GR UR: 1.02 (ref 1–1.03)
UROBILINOGEN UR QL: NORMAL

## 2019-11-21 PROCEDURE — 99203 OFFICE O/P NEW LOW 30 MIN: CPT | Performed by: UROLOGY

## 2019-11-21 PROCEDURE — 81003 URINALYSIS AUTO W/O SCOPE: CPT | Performed by: UROLOGY

## 2019-11-21 RX ORDER — OXYBUTYNIN CHLORIDE 5 MG/1
5 TABLET ORAL DAILY
Qty: 30 TABLET | Refills: 3 | Status: SHIPPED | OUTPATIENT
Start: 2019-11-21 | End: 2019-12-21

## 2019-11-21 NOTE — PROGRESS NOTES
Chief Complaint:          Chief Complaint   Patient presents with   • Frequent UTIs       HPI:   78 y.o. female referred for problems with urinary frequency.  She has constant wetting, she wears 5 depends per day and soaks them.  Its getting worse and she has been ignoring it for the last several years.. He has significant allergies to Rocephin and all other cephalosporins.  He has had a leg fracture in .  She has a negative urinalysis, and negligible post void residual.  Normal bowel movements.  She is a  0 para 0.  Her wedding is constant dribbling dripping both urge and stress related.  She is never had an exam in the last 6 years.    Past Medical History:        Past Medical History:   Diagnosis Date   • Arthritis    • Diabetes mellitus (CMS/HCC)    • Disease of thyroid gland    • Elevated cholesterol    • GERD (gastroesophageal reflux disease)    • Hyperlipidemia    • Hypertension    • Smell and taste disorder          Current Meds:     Current Outpatient Medications   Medication Sig Dispense Refill   • aspirin 81 MG EC tablet Take 81 mg by mouth Daily.     • atorvastatin (LIPITOR) 10 MG tablet Take 10 mg by mouth Daily.     • fluticasone (FLONASE) 50 MCG/ACT nasal spray 1 spray into the nostril(s) as directed by provider 2 (Two) Times a Day.     • glipiZIDE (GLUCOTROL) 5 MG tablet Take 5 mg by mouth Daily.     • levothyroxine (SYNTHROID, LEVOTHROID) 75 MCG tablet Take 75 mcg by mouth Daily.     • losartan (COZAAR) 100 MG tablet Take 100 mg by mouth Daily.     • PARoxetine (PAXIL) 10 MG tablet Take 10 mg by mouth Every Morning.     • raNITIdine (ZANTAC) 150 MG tablet Take 150 mg by mouth Daily.       No current facility-administered medications for this visit.         Allergies:      Allergies   Allergen Reactions   • Ancef [Cefazolin] Hives   • Rocephin [Ceftriaxone] Hives     Was in hospital for 14 days after taking it.         Past Surgical History:     Past Surgical History:   Procedure  Laterality Date   • ANKLE SURGERY     • COLONOSCOPY     • ENDOSCOPY N/A 10/24/2018    Procedure: ESOPHAGOGASTRODUODENOSCOPY WITH BIOPSY CPT CODE: 25176;  Surgeon: Mark Hardin MD;  Location: [facility];  Service: Gastroenterology   • HYSTERECTOMY      partial         Social History:     Social History     Socioeconomic History   • Marital status:      Spouse name: Not on file   • Number of children: Not on file   • Years of education: Not on file   • Highest education level: Not on file   Tobacco Use   • Smoking status: Never Smoker   • Smokeless tobacco: Never Used   Substance and Sexual Activity   • Alcohol use: No   • Drug use: No   • Sexual activity: Defer       Family History:     Family History   Problem Relation Age of Onset   • Cancer Mother    • Alzheimer's disease Mother    • Uterine cancer Mother    • Uterine cancer Maternal Grandmother        Review of Systems:     Review of Systems   Constitutional: Negative.  Negative for activity change, appetite change, chills, diaphoresis, fatigue and unexpected weight change.   HENT: Negative for congestion, dental problem, drooling, ear discharge, ear pain, facial swelling, hearing loss, mouth sores, nosebleeds, postnasal drip, rhinorrhea, sinus pressure, sneezing, sore throat, tinnitus, trouble swallowing and voice change.    Eyes: Negative.  Negative for photophobia, pain, discharge, redness, itching and visual disturbance.   Respiratory: Negative.  Negative for apnea, cough, choking, chest tightness, shortness of breath, wheezing and stridor.    Cardiovascular: Negative.  Negative for chest pain, palpitations and leg swelling.   Gastrointestinal: Negative.  Negative for abdominal distention, abdominal pain, anal bleeding, blood in stool, constipation, diarrhea, nausea, rectal pain and vomiting.   Endocrine: Negative.  Negative for cold intolerance, heat intolerance, polydipsia, polyphagia and polyuria.   Musculoskeletal: Negative.  Negative for  arthralgias, back pain, gait problem, joint swelling, myalgias, neck pain and neck stiffness.   Skin: Negative.  Negative for color change, pallor, rash and wound.   Allergic/Immunologic: Negative.  Negative for environmental allergies, food allergies and immunocompromised state.   Neurological: Negative.  Negative for dizziness, tremors, seizures, syncope, facial asymmetry, speech difficulty, weakness, light-headedness, numbness and headaches.   Hematological: Negative.  Negative for adenopathy. Does not bruise/bleed easily.   Psychiatric/Behavioral: Negative for agitation, behavioral problems, confusion, decreased concentration, dysphoric mood, hallucinations, self-injury, sleep disturbance and suicidal ideas. The patient is not nervous/anxious and is not hyperactive.    All other systems reviewed and are negative.      Physical Exam:     Physical Exam   Constitutional: She appears well-developed and well-nourished.   HENT:   Head: Normocephalic and atraumatic.   Right Ear: External ear normal.   Left Ear: External ear normal.   Mouth/Throat: Oropharynx is clear and moist.   Eyes: Conjunctivae are normal. Pupils are equal, round, and reactive to light.   Cardiovascular: Normal rate, regular rhythm, normal heart sounds and intact distal pulses.   Pulmonary/Chest: Effort normal and breath sounds normal.   Abdominal: Soft. Bowel sounds are normal. She exhibits no distension and no mass. There is no tenderness. There is no rebound and no guarding.   Genitourinary: No vaginal discharge found.   Genitourinary Comments: Soft nontender abdomen with no organomegaly, rigidity, guarding or tenderness.  Normal vaginal orifice.  No evidence of prolapse no palpable masses.  No significant perineal body abnormalities and a normal external anus.  Neurologic exam is nonfocal.   Musculoskeletal: Normal range of motion.   Neurological: She is alert. She has normal reflexes.   Skin: Skin is warm and dry.   Psychiatric: She has a  normal mood and affect. Her behavior is normal. Judgment and thought content normal.       I have reviewed the following portions of the patient's history: allergies, current medications, past family history, past medical history, past social history, past surgical history, problem list and ROS and confirm it's accurate.      Procedure:       Assessment/Plan:   Urinary incontinence:  Patient was diagnosed with urinary incontinence.  We discussed treatable and non-treatable causes of both stress and urge urinary incontinence.  With regards to stress urinary incontinence we discussed its relationship to childbirth and pelvic health.  We discussed the grading of stress incontinence with trying to quantitate the number of pads used.  We talked about leaking urine with laughing, lifting, coughing, and sexual intercourse.  Talked about the urge component and the concept of mixed incontinence where upon the stress treatable at the urge may exist and that 50% of the time the urge will resolve with treatment of the stress incontinence.  We talked with the diagnostic workup including a postvoid residual urine, urine and even a simple cystometrogram.  I discussed the findings that may be neurologically related including commonly seen with multiple sclerosis, Parkinson's disease, and stroke.  I talked about the various therapeutic options including anticholinergics, beta 3 agonists, and alpha blockade if there is a component of obstruction.  I discussed the side effects of anti-cholinergic including dry mouth, double vision etc.      Patient reports that she is  currently experiencing any symptoms of urinary incontinence.      Patient's Body mass index is 24.67 kg/m². BMI is within normal parameters. No follow-up required..              This document has been electronically signed by COMFORT ARIZA MD November 21, 2019 9:46 AM

## 2019-11-22 PROBLEM — R32 URINARY INCONTINENCE: Status: ACTIVE | Noted: 2019-11-22

## 2023-08-13 DEVICE — XIENCE SKYPOINT™ EVEROLIMUS ELUTING CORONARY STENT SYSTEM 3.50 MM X 15 MM / RAPID-EXCHANGE
Type: IMPLANTABLE DEVICE | Site: CORONARY | Status: FUNCTIONAL
Brand: XIENCE SKYPOINT™

## 2023-08-13 DEVICE — XIENCE SKYPOINT™ EVEROLIMUS ELUTING CORONARY STENT SYSTEM 2.25 MM X 18 MM / RAPID-EXCHANGE
Type: IMPLANTABLE DEVICE | Site: CORONARY | Status: FUNCTIONAL
Brand: XIENCE SKYPOINT™

## 2023-08-14 ENCOUNTER — HOSPITAL ENCOUNTER (INPATIENT)
Facility: HOSPITAL | Age: 83
LOS: 1 days | Discharge: HOME | DRG: 247 | End: 2023-08-15
Attending: INTERNAL MEDICINE | Admitting: INTERNAL MEDICINE
Payer: MEDICARE

## 2023-08-14 ENCOUNTER — APPOINTMENT (OUTPATIENT)
Dept: CARDIOLOGY | Facility: HOSPITAL | Age: 83
DRG: 247 | End: 2023-08-14
Payer: MEDICARE

## 2023-08-14 ENCOUNTER — APPOINTMENT (OUTPATIENT)
Dept: GENERAL RADIOLOGY | Facility: HOSPITAL | Age: 83
DRG: 247 | End: 2023-08-14
Payer: MEDICARE

## 2023-08-14 DIAGNOSIS — R13.10 ABNORMAL SWALLOWING: Primary | ICD-10-CM

## 2023-08-14 PROBLEM — I24.9 ACUTE CORONARY SYNDROME WITH HIGH TROPONIN: Status: ACTIVE | Noted: 2023-08-14

## 2023-08-14 LAB
ACT BLD: 251 SECONDS (ref 82–152)
ANION GAP SERPL CALCULATED.3IONS-SCNC: 10.7 MMOL/L (ref 5–15)
BH CV ECHO MEAS - ACS: 1.4 CM
BH CV ECHO MEAS - AI P1/2T: 507.9 MSEC
BH CV ECHO MEAS - AO MAX PG: 23 MMHG
BH CV ECHO MEAS - AO MEAN PG: 12 MMHG
BH CV ECHO MEAS - AO ROOT DIAM: 3 CM
BH CV ECHO MEAS - AO V2 MAX: 240 CM/SEC
BH CV ECHO MEAS - AO V2 VTI: 60.7 CM
BH CV ECHO MEAS - AVA(I,D): 2.31 CM2
BH CV ECHO MEAS - EDV(CUBED): 61.6 ML
BH CV ECHO MEAS - EDV(MOD-SP4): 107 ML
BH CV ECHO MEAS - EF(MOD-BP): 57 %
BH CV ECHO MEAS - EF(MOD-SP4): 57.6 %
BH CV ECHO MEAS - ESV(CUBED): 25.2 ML
BH CV ECHO MEAS - ESV(MOD-SP4): 45.4 ML
BH CV ECHO MEAS - FS: 25.8 %
BH CV ECHO MEAS - IVS/LVPW: 1.18 CM
BH CV ECHO MEAS - IVSD: 1.29 CM
BH CV ECHO MEAS - LA DIMENSION: 4 CM
BH CV ECHO MEAS - LAT PEAK E' VEL: 5.9 CM/SEC
BH CV ECHO MEAS - LV DIASTOLIC VOL/BSA (35-75): 62 CM2
BH CV ECHO MEAS - LV MASS(C)D: 160.3 GRAMS
BH CV ECHO MEAS - LV MAX PG: 8.2 MMHG
BH CV ECHO MEAS - LV MEAN PG: 4 MMHG
BH CV ECHO MEAS - LV SYSTOLIC VOL/BSA (12-30): 26.3 CM2
BH CV ECHO MEAS - LV V1 MAX: 143 CM/SEC
BH CV ECHO MEAS - LV V1 VTI: 36.9 CM
BH CV ECHO MEAS - LVIDD: 4 CM
BH CV ECHO MEAS - LVIDS: 2.9 CM
BH CV ECHO MEAS - LVOT AREA: 3.8 CM2
BH CV ECHO MEAS - LVOT DIAM: 2.2 CM
BH CV ECHO MEAS - LVPWD: 1.09 CM
BH CV ECHO MEAS - MED PEAK E' VEL: 4 CM/SEC
BH CV ECHO MEAS - MV A MAX VEL: 120 CM/SEC
BH CV ECHO MEAS - MV E MAX VEL: 88.1 CM/SEC
BH CV ECHO MEAS - MV E/A: 0.73
BH CV ECHO MEAS - PA ACC TIME: 0.1 SEC
BH CV ECHO MEAS - RAP SYSTOLE: 10 MMHG
BH CV ECHO MEAS - RVSP: 31.3 MMHG
BH CV ECHO MEAS - SI(MOD-SP4): 35.7 ML/M2
BH CV ECHO MEAS - SV(LVOT): 140.3 ML
BH CV ECHO MEAS - SV(MOD-SP4): 61.6 ML
BH CV ECHO MEAS - TAPSE (>1.6): 1.71 CM
BH CV ECHO MEAS - TR MAX PG: 21.3 MMHG
BH CV ECHO MEAS - TR MAX VEL: 231 CM/SEC
BH CV ECHO MEASUREMENTS AVERAGE E/E' RATIO: 17.8
BUN SERPL-MCNC: 24 MG/DL (ref 8–23)
BUN/CREAT SERPL: 19.4 (ref 7–25)
CALCIUM SPEC-SCNC: 9.2 MG/DL (ref 8.6–10.5)
CHLORIDE SERPL-SCNC: 102 MMOL/L (ref 98–107)
CHOLEST SERPL-MCNC: 92 MG/DL (ref 0–200)
CO2 SERPL-SCNC: 23.3 MMOL/L (ref 22–29)
CREAT SERPL-MCNC: 1.24 MG/DL (ref 0.57–1)
DEPRECATED RDW RBC AUTO: 39.8 FL (ref 37–54)
EGFRCR SERPLBLD CKD-EPI 2021: 44.1 ML/MIN/1.73
ERYTHROCYTE [DISTWIDTH] IN BLOOD BY AUTOMATED COUNT: 12.7 % (ref 12.3–15.4)
GEN 5 2HR TROPONIN T REFLEX: 386 NG/L
GLUCOSE BLDC GLUCOMTR-MCNC: 109 MG/DL (ref 70–130)
GLUCOSE BLDC GLUCOMTR-MCNC: 214 MG/DL (ref 70–130)
GLUCOSE BLDC GLUCOMTR-MCNC: 70 MG/DL (ref 70–130)
GLUCOSE BLDC GLUCOMTR-MCNC: 81 MG/DL (ref 70–130)
GLUCOSE SERPL-MCNC: 128 MG/DL (ref 65–99)
HBA1C MFR BLD: 5.5 % (ref 4.8–5.6)
HCT VFR BLD AUTO: 37.4 % (ref 34–46.6)
HDLC SERPL-MCNC: 41 MG/DL (ref 40–60)
HGB BLD-MCNC: 12.9 G/DL (ref 12–15.9)
LDLC SERPL CALC-MCNC: 39 MG/DL (ref 0–100)
LDLC/HDLC SERPL: 1.03 {RATIO}
LEFT ATRIUM VOLUME INDEX: 18.7 ML/M2
MCH RBC QN AUTO: 29.7 PG (ref 26.6–33)
MCHC RBC AUTO-ENTMCNC: 34.5 G/DL (ref 31.5–35.7)
MCV RBC AUTO: 86.2 FL (ref 79–97)
PLATELET # BLD AUTO: 174 10*3/MM3 (ref 140–450)
PMV BLD AUTO: 11.4 FL (ref 6–12)
POTASSIUM SERPL-SCNC: 4.7 MMOL/L (ref 3.5–5.2)
RBC # BLD AUTO: 4.34 10*6/MM3 (ref 3.77–5.28)
SODIUM SERPL-SCNC: 136 MMOL/L (ref 136–145)
TRIGL SERPL-MCNC: 43 MG/DL (ref 0–150)
TROPONIN T DELTA: 43 NG/L
TROPONIN T SERPL HS-MCNC: 343 NG/L
TSH SERPL DL<=0.05 MIU/L-ACNC: 0.65 UIU/ML (ref 0.27–4.2)
VLDLC SERPL-MCNC: 12 MG/DL (ref 5–40)
WBC NRBC COR # BLD: 12.52 10*3/MM3 (ref 3.4–10.8)

## 2023-08-14 PROCEDURE — 85027 COMPLETE CBC AUTOMATED: CPT | Performed by: INTERNAL MEDICINE

## 2023-08-14 PROCEDURE — 92611 MOTION FLUOROSCOPY/SWALLOW: CPT

## 2023-08-14 PROCEDURE — 93454 CORONARY ARTERY ANGIO S&I: CPT | Performed by: INTERNAL MEDICINE

## 2023-08-14 PROCEDURE — 93010 ELECTROCARDIOGRAM REPORT: CPT | Performed by: INTERNAL MEDICINE

## 2023-08-14 PROCEDURE — C9606 PERC D-E COR REVASC W AMI S: HCPCS | Performed by: INTERNAL MEDICINE

## 2023-08-14 PROCEDURE — C1887 CATHETER, GUIDING: HCPCS | Performed by: INTERNAL MEDICINE

## 2023-08-14 PROCEDURE — 92941 PRQ TRLML REVSC TOT OCCL AMI: CPT | Performed by: INTERNAL MEDICINE

## 2023-08-14 PROCEDURE — 99223 1ST HOSP IP/OBS HIGH 75: CPT | Performed by: INTERNAL MEDICINE

## 2023-08-14 PROCEDURE — B2111ZZ FLUOROSCOPY OF MULTIPLE CORONARY ARTERIES USING LOW OSMOLAR CONTRAST: ICD-10-PCS | Performed by: INTERNAL MEDICINE

## 2023-08-14 PROCEDURE — 25010000002 HEPARIN (PORCINE) PER 1000 UNITS: Performed by: INTERNAL MEDICINE

## 2023-08-14 PROCEDURE — 74230 X-RAY XM SWLNG FUNCJ C+: CPT

## 2023-08-14 PROCEDURE — C9600 PERC DRUG-EL COR STENT SING: HCPCS | Performed by: INTERNAL MEDICINE

## 2023-08-14 PROCEDURE — 80061 LIPID PANEL: CPT | Performed by: INTERNAL MEDICINE

## 2023-08-14 PROCEDURE — C1725 CATH, TRANSLUMIN NON-LASER: HCPCS | Performed by: INTERNAL MEDICINE

## 2023-08-14 PROCEDURE — 93306 TTE W/DOPPLER COMPLETE: CPT

## 2023-08-14 PROCEDURE — C1894 INTRO/SHEATH, NON-LASER: HCPCS | Performed by: INTERNAL MEDICINE

## 2023-08-14 PROCEDURE — C1874 STENT, COATED/COV W/DEL SYS: HCPCS | Performed by: INTERNAL MEDICINE

## 2023-08-14 PROCEDURE — 84484 ASSAY OF TROPONIN QUANT: CPT | Performed by: INTERNAL MEDICINE

## 2023-08-14 PROCEDURE — 25010000002 MIDAZOLAM PER 1 MG: Performed by: INTERNAL MEDICINE

## 2023-08-14 PROCEDURE — C1769 GUIDE WIRE: HCPCS | Performed by: INTERNAL MEDICINE

## 2023-08-14 PROCEDURE — 99152 MOD SED SAME PHYS/QHP 5/>YRS: CPT | Performed by: INTERNAL MEDICINE

## 2023-08-14 PROCEDURE — 93306 TTE W/DOPPLER COMPLETE: CPT | Performed by: INTERNAL MEDICINE

## 2023-08-14 PROCEDURE — 25510000001 IOPAMIDOL PER 1 ML: Performed by: INTERNAL MEDICINE

## 2023-08-14 PROCEDURE — 25010000002 ONDANSETRON PER 1 MG: Performed by: INTERNAL MEDICINE

## 2023-08-14 PROCEDURE — 82948 REAGENT STRIP/BLOOD GLUCOSE: CPT

## 2023-08-14 PROCEDURE — 85347 COAGULATION TIME ACTIVATED: CPT

## 2023-08-14 PROCEDURE — 80048 BASIC METABOLIC PNL TOTAL CA: CPT | Performed by: INTERNAL MEDICINE

## 2023-08-14 PROCEDURE — 84443 ASSAY THYROID STIM HORMONE: CPT | Performed by: INTERNAL MEDICINE

## 2023-08-14 PROCEDURE — 4A023N7 MEASUREMENT OF CARDIAC SAMPLING AND PRESSURE, LEFT HEART, PERCUTANEOUS APPROACH: ICD-10-PCS | Performed by: INTERNAL MEDICINE

## 2023-08-14 PROCEDURE — 83036 HEMOGLOBIN GLYCOSYLATED A1C: CPT | Performed by: INTERNAL MEDICINE

## 2023-08-14 PROCEDURE — 92928 PRQ TCAT PLMT NTRAC ST 1 LES: CPT | Performed by: INTERNAL MEDICINE

## 2023-08-14 PROCEDURE — 93005 ELECTROCARDIOGRAM TRACING: CPT | Performed by: INTERNAL MEDICINE

## 2023-08-14 PROCEDURE — C1760 CLOSURE DEV, VASC: HCPCS | Performed by: INTERNAL MEDICINE

## 2023-08-14 PROCEDURE — 25010000002 FENTANYL CITRATE (PF) 50 MCG/ML SOLUTION: Performed by: INTERNAL MEDICINE

## 2023-08-14 PROCEDURE — 99232 SBSQ HOSP IP/OBS MODERATE 35: CPT | Performed by: INTERNAL MEDICINE

## 2023-08-14 PROCEDURE — 027034Z DILATION OF CORONARY ARTERY, ONE ARTERY WITH DRUG-ELUTING INTRALUMINAL DEVICE, PERCUTANEOUS APPROACH: ICD-10-PCS | Performed by: INTERNAL MEDICINE

## 2023-08-14 PROCEDURE — 74230 X-RAY XM SWLNG FUNCJ C+: CPT | Performed by: RADIOLOGY

## 2023-08-14 RX ORDER — MECLIZINE HYDROCHLORIDE 25 MG/1
25 TABLET ORAL 3 TIMES DAILY PRN
Status: CANCELLED | OUTPATIENT
Start: 2023-08-14

## 2023-08-14 RX ORDER — ALUMINA, MAGNESIA, AND SIMETHICONE 2400; 2400; 240 MG/30ML; MG/30ML; MG/30ML
15 SUSPENSION ORAL EVERY 6 HOURS PRN
Status: DISCONTINUED | OUTPATIENT
Start: 2023-08-14 | End: 2023-08-15 | Stop reason: HOSPADM

## 2023-08-14 RX ORDER — METOPROLOL SUCCINATE 50 MG/1
50 TABLET, EXTENDED RELEASE ORAL DAILY
COMMUNITY

## 2023-08-14 RX ORDER — DOCUSATE SODIUM 100 MG/1
100 CAPSULE, LIQUID FILLED ORAL 2 TIMES DAILY
Status: DISCONTINUED | OUTPATIENT
Start: 2023-08-14 | End: 2023-08-15 | Stop reason: HOSPADM

## 2023-08-14 RX ORDER — ROSUVASTATIN CALCIUM 20 MG/1
20 TABLET, COATED ORAL NIGHTLY
Status: DISCONTINUED | OUTPATIENT
Start: 2023-08-14 | End: 2023-08-15 | Stop reason: HOSPADM

## 2023-08-14 RX ORDER — BUSPIRONE HYDROCHLORIDE 10 MG/1
10 TABLET ORAL 2 TIMES DAILY
Status: CANCELLED | OUTPATIENT
Start: 2023-08-14

## 2023-08-14 RX ORDER — SODIUM CHLORIDE 9 MG/ML
INJECTION, SOLUTION INTRAVENOUS
Status: COMPLETED | OUTPATIENT
Start: 2023-08-14 | End: 2023-08-14

## 2023-08-14 RX ORDER — GLIPIZIDE 5 MG/1
5 TABLET ORAL DAILY
Status: DISCONTINUED | OUTPATIENT
Start: 2023-08-14 | End: 2023-08-15 | Stop reason: HOSPADM

## 2023-08-14 RX ORDER — TRAZODONE HYDROCHLORIDE 50 MG/1
50 TABLET ORAL NIGHTLY
COMMUNITY

## 2023-08-14 RX ORDER — HYDROXYZINE HYDROCHLORIDE 25 MG/1
25 TABLET, FILM COATED ORAL EVERY 6 HOURS PRN
Status: DISCONTINUED | OUTPATIENT
Start: 2023-08-14 | End: 2023-08-15 | Stop reason: HOSPADM

## 2023-08-14 RX ORDER — ONDANSETRON 4 MG/1
4 TABLET, FILM COATED ORAL EVERY 6 HOURS PRN
Status: DISCONTINUED | OUTPATIENT
Start: 2023-08-14 | End: 2023-08-15 | Stop reason: HOSPADM

## 2023-08-14 RX ORDER — TRIAMCINOLONE ACETONIDE 1 MG/G
1 CREAM TOPICAL 2 TIMES DAILY PRN
COMMUNITY

## 2023-08-14 RX ORDER — HYDROXYZINE PAMOATE 25 MG/1
25 CAPSULE ORAL EVERY 6 HOURS PRN
Status: DISCONTINUED | OUTPATIENT
Start: 2023-08-14 | End: 2023-08-14

## 2023-08-14 RX ORDER — OXYBUTYNIN CHLORIDE 5 MG/1
5 TABLET ORAL DAILY
Status: CANCELLED | OUTPATIENT
Start: 2023-08-14

## 2023-08-14 RX ORDER — ATORVASTATIN CALCIUM 80 MG/1
80 TABLET, FILM COATED ORAL NIGHTLY
COMMUNITY

## 2023-08-14 RX ORDER — TRAZODONE HYDROCHLORIDE 50 MG/1
50 TABLET ORAL NIGHTLY
Status: CANCELLED | OUTPATIENT
Start: 2023-08-14

## 2023-08-14 RX ORDER — ONDANSETRON 2 MG/ML
4 INJECTION INTRAMUSCULAR; INTRAVENOUS EVERY 6 HOURS PRN
Status: DISCONTINUED | OUTPATIENT
Start: 2023-08-14 | End: 2023-08-15 | Stop reason: HOSPADM

## 2023-08-14 RX ORDER — LEVOTHYROXINE SODIUM 0.07 MG/1
75 TABLET ORAL DAILY
Status: DISCONTINUED | OUTPATIENT
Start: 2023-08-14 | End: 2023-08-15 | Stop reason: HOSPADM

## 2023-08-14 RX ORDER — METOPROLOL SUCCINATE 50 MG/1
50 TABLET, EXTENDED RELEASE ORAL DAILY
Status: CANCELLED | OUTPATIENT
Start: 2023-08-14

## 2023-08-14 RX ORDER — ATORVASTATIN CALCIUM 40 MG/1
80 TABLET, FILM COATED ORAL NIGHTLY
Status: CANCELLED | OUTPATIENT
Start: 2023-08-14

## 2023-08-14 RX ORDER — OXYBUTYNIN CHLORIDE 5 MG/1
5 TABLET ORAL DAILY
COMMUNITY

## 2023-08-14 RX ORDER — MIDAZOLAM HYDROCHLORIDE 1 MG/ML
INJECTION INTRAMUSCULAR; INTRAVENOUS
Status: DISCONTINUED | OUTPATIENT
Start: 2023-08-14 | End: 2023-08-14 | Stop reason: HOSPADM

## 2023-08-14 RX ORDER — TRIAMCINOLONE ACETONIDE 1 MG/G
1 CREAM TOPICAL 2 TIMES DAILY PRN
Status: CANCELLED | OUTPATIENT
Start: 2023-08-14

## 2023-08-14 RX ORDER — LIDOCAINE HYDROCHLORIDE 20 MG/ML
INJECTION, SOLUTION INFILTRATION; PERINEURAL
Status: DISCONTINUED | OUTPATIENT
Start: 2023-08-14 | End: 2023-08-14 | Stop reason: HOSPADM

## 2023-08-14 RX ORDER — LORAZEPAM 1 MG/1
1 TABLET ORAL EVERY 6 HOURS PRN
Status: DISCONTINUED | OUTPATIENT
Start: 2023-08-14 | End: 2023-08-15 | Stop reason: HOSPADM

## 2023-08-14 RX ORDER — PAROXETINE 10 MG/1
10 TABLET, FILM COATED ORAL EVERY MORNING
Status: DISCONTINUED | OUTPATIENT
Start: 2023-08-14 | End: 2023-08-15 | Stop reason: HOSPADM

## 2023-08-14 RX ORDER — FENTANYL CITRATE 50 UG/ML
INJECTION, SOLUTION INTRAMUSCULAR; INTRAVENOUS
Status: DISCONTINUED | OUTPATIENT
Start: 2023-08-14 | End: 2023-08-14 | Stop reason: HOSPADM

## 2023-08-14 RX ORDER — BUSPIRONE HYDROCHLORIDE 10 MG/1
10 TABLET ORAL 2 TIMES DAILY
COMMUNITY

## 2023-08-14 RX ORDER — DEXTROSE MONOHYDRATE 25 G/50ML
25 INJECTION, SOLUTION INTRAVENOUS
Status: DISCONTINUED | OUTPATIENT
Start: 2023-08-14 | End: 2023-08-15 | Stop reason: HOSPADM

## 2023-08-14 RX ORDER — NICOTINE POLACRILEX 4 MG
15 LOZENGE BUCCAL
Status: DISCONTINUED | OUTPATIENT
Start: 2023-08-14 | End: 2023-08-15 | Stop reason: HOSPADM

## 2023-08-14 RX ORDER — ACETAMINOPHEN 325 MG/1
650 TABLET ORAL EVERY 4 HOURS PRN
Status: DISCONTINUED | OUTPATIENT
Start: 2023-08-14 | End: 2023-08-15 | Stop reason: HOSPADM

## 2023-08-14 RX ORDER — HEPARIN SODIUM 1000 [USP'U]/ML
INJECTION, SOLUTION INTRAVENOUS; SUBCUTANEOUS
Status: DISCONTINUED | OUTPATIENT
Start: 2023-08-14 | End: 2023-08-14 | Stop reason: HOSPADM

## 2023-08-14 RX ORDER — NAPROXEN 250 MG/1
500 TABLET ORAL 2 TIMES DAILY WITH MEALS
Status: CANCELLED | OUTPATIENT
Start: 2023-08-14

## 2023-08-14 RX ORDER — FUROSEMIDE 20 MG/1
20 TABLET ORAL DAILY
Status: CANCELLED | OUTPATIENT
Start: 2023-08-14

## 2023-08-14 RX ORDER — SODIUM CHLORIDE 9 MG/ML
1 INJECTION, SOLUTION INTRAVENOUS CONTINUOUS
Status: ACTIVE | OUTPATIENT
Start: 2023-08-14 | End: 2023-08-14

## 2023-08-14 RX ORDER — FUROSEMIDE 20 MG/1
20 TABLET ORAL DAILY
COMMUNITY

## 2023-08-14 RX ORDER — NITROGLYCERIN 0.4 MG/1
0.4 TABLET SUBLINGUAL
Status: DISCONTINUED | OUTPATIENT
Start: 2023-08-14 | End: 2023-08-15 | Stop reason: HOSPADM

## 2023-08-14 RX ORDER — MECLIZINE HYDROCHLORIDE 25 MG/1
25 TABLET ORAL 3 TIMES DAILY PRN
COMMUNITY

## 2023-08-14 RX ORDER — LOSARTAN POTASSIUM 50 MG/1
50 TABLET ORAL DAILY
Status: DISCONTINUED | OUTPATIENT
Start: 2023-08-14 | End: 2023-08-15

## 2023-08-14 RX ORDER — PANTOPRAZOLE SODIUM 40 MG/1
40 TABLET, DELAYED RELEASE ORAL
Status: DISCONTINUED | OUTPATIENT
Start: 2023-08-14 | End: 2023-08-15 | Stop reason: HOSPADM

## 2023-08-14 RX ORDER — ASPIRIN 81 MG/1
81 TABLET ORAL DAILY
Status: DISCONTINUED | OUTPATIENT
Start: 2023-08-14 | End: 2023-08-15 | Stop reason: HOSPADM

## 2023-08-14 RX ORDER — ASPIRIN 81 MG/1
81 TABLET ORAL DAILY
Status: DISCONTINUED | OUTPATIENT
Start: 2023-08-14 | End: 2023-08-14 | Stop reason: SDUPTHER

## 2023-08-14 RX ORDER — SULINDAC 200 MG/1
200 TABLET ORAL 2 TIMES DAILY
COMMUNITY
End: 2023-08-15

## 2023-08-14 RX ORDER — GUAIFENESIN 200 MG/10ML
200 LIQUID ORAL EVERY 4 HOURS PRN
Status: DISCONTINUED | OUTPATIENT
Start: 2023-08-14 | End: 2023-08-15 | Stop reason: HOSPADM

## 2023-08-14 RX ORDER — FLUTICASONE PROPIONATE 50 MCG
1 SPRAY, SUSPENSION (ML) NASAL 2 TIMES DAILY
Status: DISCONTINUED | OUTPATIENT
Start: 2023-08-14 | End: 2023-08-15 | Stop reason: HOSPADM

## 2023-08-14 RX ORDER — TEMAZEPAM 15 MG/1
15 CAPSULE ORAL NIGHTLY PRN
Status: DISCONTINUED | OUTPATIENT
Start: 2023-08-14 | End: 2023-08-15 | Stop reason: HOSPADM

## 2023-08-14 RX ADMIN — ASPIRIN 81 MG: 81 TABLET, COATED ORAL at 08:42

## 2023-08-14 RX ADMIN — DEXTROSE MONOHYDRATE 25 G: 25 INJECTION, SOLUTION INTRAVENOUS at 11:25

## 2023-08-14 RX ADMIN — FLUTICASONE PROPIONATE 1 SPRAY: 50 SPRAY, METERED NASAL at 20:42

## 2023-08-14 RX ADMIN — GLIPIZIDE 5 MG: 5 TABLET ORAL at 08:43

## 2023-08-14 RX ADMIN — DOCUSATE SODIUM 100 MG: 100 CAPSULE, LIQUID FILLED ORAL at 08:42

## 2023-08-14 RX ADMIN — ONDANSETRON HYDROCHLORIDE 4 MG: 2 INJECTION, SOLUTION INTRAMUSCULAR; INTRAVENOUS at 02:09

## 2023-08-14 RX ADMIN — ROSUVASTATIN CALCIUM 20 MG: 20 TABLET, FILM COATED ORAL at 20:42

## 2023-08-14 RX ADMIN — DOCUSATE SODIUM 100 MG: 100 CAPSULE, LIQUID FILLED ORAL at 20:42

## 2023-08-14 RX ADMIN — SODIUM CHLORIDE 1 ML/KG/HR: 9 INJECTION, SOLUTION INTRAVENOUS at 02:16

## 2023-08-14 RX ADMIN — PAROXETINE 10 MG: 10 TABLET, FILM COATED ORAL at 06:12

## 2023-08-14 RX ADMIN — TICAGRELOR 90 MG: 90 TABLET ORAL at 20:42

## 2023-08-14 RX ADMIN — FLUTICASONE PROPIONATE 1 SPRAY: 50 SPRAY, METERED NASAL at 08:44

## 2023-08-14 RX ADMIN — TICAGRELOR 90 MG: 90 TABLET ORAL at 08:41

## 2023-08-14 RX ADMIN — ROSUVASTATIN CALCIUM 20 MG: 20 TABLET, FILM COATED ORAL at 02:58

## 2023-08-14 RX ADMIN — PANTOPRAZOLE SODIUM 40 MG: 40 TABLET, DELAYED RELEASE ORAL at 05:34

## 2023-08-14 RX ADMIN — LEVOTHYROXINE SODIUM 75 MCG: 0.07 TABLET ORAL at 08:43

## 2023-08-14 RX ADMIN — GUAIFENESIN 200 MG: 200 SOLUTION ORAL at 20:42

## 2023-08-14 RX ADMIN — HYDROXYZINE HYDROCHLORIDE 25 MG: 25 TABLET, FILM COATED ORAL at 02:58

## 2023-08-14 NOTE — PLAN OF CARE
Goal Outcome Evaluation:  Plan of Care Reviewed With: patient           Outcome Evaluation: Pt welcomed to PCU this shift. Pt A+Ox4. VSS, afebrile, on RA. Pt had a heart cath through the right groin. Site is clean, dry, intact, and soft. Capillary refill is less than 3 seconds. Pt was on bed rest until 0445 this morning. Pt has ambulated to the bathroom since the bed rest orders have been lifted. NS infusing at 66.8mL/hr. Was temporarily stopped due to loss of IV access. Just restarted it. Bed in lowest position, alarm on. Call light in reach. Pt denies any requests at this time. Pt did have an episode of bradycardia. EKG done. Showed sinus mago. HR currently 58.

## 2023-08-14 NOTE — MBS/VFSS/FEES
Acute Care - Speech Language Pathology   Swallow Initial Evaluation [facility]  MODIFIED BARIUM SWALLOW STUDY     Patient Name: Amanda Cruz  : 1940  MRN: [id number]  Today's Date: 2023             Admit Date: 2023    Visit Dx:   No diagnosis found.  Patient Active Problem List   Diagnosis    Gastroesophageal reflux disease    Dysphagia    Abnormal swallowing    Urinary incontinence    Acute coronary syndrome with high troponin     Past Medical History:   Diagnosis Date    Arthritis     Diabetes mellitus     Disease of thyroid gland     Elevated cholesterol     GERD (gastroesophageal reflux disease)     Hyperlipidemia     Hypertension     Smell and taste disorder      Past Surgical History:   Procedure Laterality Date    ANKLE SURGERY      CARDIAC CATHETERIZATION N/A 2023    Procedure: Left Heart Cath;  Surgeon: Phu Ramírez MD;  Location: [facility] CATH INVASIVE LOCATION;  Service: Cardiovascular;  Laterality: N/A;    CARDIAC CATHETERIZATION N/A 2023    Procedure: Percutaneous Coronary Intervention;  Surgeon: Phu Ramírez MD;  Location: [facility] INVASIVE LOCATION;  Service: Cardiovascular;  Laterality: N/A;    COLONOSCOPY      ENDOSCOPY N/A 10/24/2018    Procedure: ESOPHAGOGASTRODUODENOSCOPY WITH BIOPSY CPT CODE: 42302;  Surgeon: Mark Hardin MD;  Location: [facility] OR;  Service: Gastroenterology    HYSTERECTOMY      partial     Amanda Cruz presented to the radiology suite this am from PCU to participate in an instrumental MBS to objectively evaluate safety/efficacy of swallowing fnx, determine safest/least restrictive diet. She is NPO pending this evaluation.     Ms. Cruz was admitted to [facility] w/ acute coronary syndrome w/ high troponin. She was transferred from another facility to which she had presented w/ chest pain. She was taken to the cath lab last pm. She was referred for dysphagia evaluation as she reported choking episodes and difficulty swallowing. She  "reported to me sore throat and vomiting prior to presentation to hospital, but denied any further n/v. She did endorse odynophagia and \"scratchy throat.\" Per this status, she was felt to most benefit from instrumental MBS.       Social History     Socioeconomic History    Marital status:    Tobacco Use    Smoking status: Never    Smokeless tobacco: Never   Vaping Use    Vaping Use: Never used   Substance and Sexual Activity    Alcohol use: No    Drug use: No    Sexual activity: Defer      Imaging:  No recent chest xray available for review.     Labs:  Sodium  136  08/14 0134  Potassium  4.7  08/14 0134  Chloride  102  08/14 0134  CO2  23.3  08/14 0134  BUN  24  08/14 0134  Creatinine  1.24  08/14 0134  Glucose  214  08/14 1149  Hemoglobin  12.9  08/14 0135  Hematocrit  37.4  08/14 0135  WBC  12.52  08/14 0135  Platelets  174  08/14 0135     Diet Orders (active) (From admission, onward)       Start     Ordered    08/14/23 0134  NPO Diet NPO Type: Sips with Meds  Diet Effective Now         08/14/23 0134                  She is observed on ra w/o complications.     Risks and benefits of the procedure were explained w/ patient verbalizing understanding/agreement to participate. Proceed per protocol.     Patient was positioned upright and centered in a supportive stationary chair to accept multiple po presentations of solid cracker, puree, honey thick, nectar thick, and thin liquids via spoon, cup and straw, along w/ whole placebo pill in puree. She was able to self provide po trials.      All views are from the lateral plane.     Facial/oral structures were symmetrical upon observation w/o lingual deviation upon protrusion. Oral mucosa were moist, pink and clean. Secretions were clear, thin, and well controlled. OROM/NATHALIE was wfl to imitate oral postures. Gag was not assessed. Volitional cough was adequate in intensity, clear in quality, nonproductive. Vocal quality was adequate in intensity, clear in quality w/ " intelligible speech. Patient was a/a and cooperative to participate, oriented to person, place, and time, follows simple directives, and participates in simple conversational exchanges.     Upon po presentations, adequate bolus anticipation w/ good labial seal for bolus clearance via spoon bowl, cup rim stability and suction via straw. Bolus formation, manipulation, and control were wfl w/ rotary mastication pattern. A-p transit was timely w/o significant oral residue. Tongue base retraction and linguavelar seal were adequate w/o significant premature spillage. No laryngeal penetration or aspiration evidenced before the swallow.     Pharyngeal swallow was timely w/ mildly weak hyolaryngeal elevation and epiglottic inversion. Transient laryngeal penetration occurred during the swallow w/ nectar thick liquids via all presentations styles, clearing upon completion of the swallow w/o significant residue. Laryngeal penetration occurred during the swallow w/ thin liquids via tsp and cup, w/ posterior epiglottic residue resulting in silent aspiration after the swallow via cup. Laryngeal penetration and silent aspiration occurred during the swallow w/ thin liquid via straw. Repeat thin liquid via cup trials reveal laryngeal penetration and aspiration during the swallow w/ elicited cough response. Cued and elicited cough were effective to diminish but not completely clear laryngeal residue and aspirated material. Compensatory chin tuck was ineffective to alleviate penetration and aspiration. Repeat nectar thick liquid trials revealed no aspiration. Pharyngeal contraction was generally weak w/ mild diffuse pharyngeal residue across consistencies. No other laryngeal penetration or aspiration evidenced during or after the swallow.     She is able to manipulate and swallow whole placebo pill in puree. Pill does lodge at approximately c6-c7, clearing after 3 additional puree presentations.                Penetration-Aspiration  Scale Scoring  Consistency: Teaspoon Bolus Cup Bolus Straw Bolus   Puree 1     Honey 1     Nectar 2 2 2   Thin 2 4,7,7 8   Solid 1       *Reference*      Full esophageal sweep reveals appearance concerning for cricopharyngeal bar at approximately c5-c6, no other obvious mucosal abnormalities. Motility appears decreased w/ delayed transit time, no obvious retrograde flow noted.      Impression: Per this evaluation, Ms. Cruz presented w/ wfl oral phase, mild pharyngeal dysphagia w/ intermittent aspiration of thin liquids. Aspiration is intermittent silent in nature.     She is felt to most benefit from modified po diet of regular consistency, nectar thick liquids, water protocol, no mixed consistencies, dysphagia tx per poc.     SLP Recommendation and Plan  1. Regular consistency, NECTAR thick liquids. No mixed consistencies.   2. Medications whole in puree/nectars.  3. Water protocol between meals/meds.   4. Universal aspiration precautions.   5. SAMUEL precautions.   6. Oral care protocol.   7. No ice cream, milkshakes or jello.   8. Dysphagia tx as tolerated per poc.   SLP to f/u for diet safety, dysphagia tx as tolerated per poc.     D/w patient results and recommendations w/ verbal understanding and agreement.     Communicated results and recommendations to RN.     Thank you for allowing me to participate in the care of your patient-  Mari Norris M.A., CCC-SLP      EDUCATION  The patient has been educated in the following areas:   Dysphagia (Swallowing Impairment).              Time Calculation:    Time Calculation- SLP       Row Name 08/14/23 1244             Time Calculation- SLP    SLP Received On 08/14/23  -      SLP - Next Appointment 08/15/23  -                User Key  (r) = Recorded By, (t) = Taken By, (c) = Cosigned By      Initials Name Provider Type    Mari Carvajal MA,CCC-SLP Speech and Language Pathologist                    Therapy Charges for Today       Code Description  Service Date Service Provider Modifiers Qty    [id number]  ST MOTION FLUORO EVAL SWALLOW 8 8/14/2023 Mari Norris MA,CCC-SLP GN 1                 Mari Norris MA,CCC-SLP  8/14/2023

## 2023-08-14 NOTE — Clinical Note
Second inflation of balloon - Max pressure = 16 lucas. 2nd Inflation of balloon - Duration = 16 seconds.

## 2023-08-14 NOTE — PLAN OF CARE
DYSPHAGIA THERAPY PLAN OF CARE:    Amanda Cruz will benefit from formal dysphagia therapy x3-5 days per week at 15-60 minute sessions, as functionally tolerated, for 3-7 days, to address:    Long Term Goal:  Patient will accept least restrictive diet tolerance w/o overt s/s aspiration.     Short Term Goals:  1. Patient perform resistive breathing exercises x7sets x7 reps w/ resistance of 1-5 to improve respiratory support and control.   2. Patient will perform laryngeal adduction/elevation exercises x3 sets x10 reps w/ min cues.   3. Patient will perform CTAR exercises repetitive x3 sets x12 reps w/ mod cues.   4. Patient will accept ice/chip water per protocol between meals and medications for oral hydration/comfort with assistance.   5. Patient will participate in instrumental re-evaluation of swallowing fnx in 3-7 days, pending progress towards this poc.    *Goals to be added/modified as necessary.     Thank you for allowing me to participate in the care of your patient-  Mari Norris M.A., CCC-SLP

## 2023-08-14 NOTE — Clinical Note
Emergency staff (EMS) delivered patient to lab.  Consents and History and Physical not obtained due to patient condition.

## 2023-08-14 NOTE — PLAN OF CARE
Problem: Adult Inpatient Plan of Care  Goal: Plan of Care Review  Outcome: Ongoing, Progressing  Flowsheets  Taken 8/14/2023 1748 by Brissa Norris RN  Progress: improving  Outcome Evaluation: VSS. Patient is on room air. Patient has ran bradycardic throughout the shift. She has been up to bedside commode. Site check performed. Swallow eval performed, patient is now on nectar thick liquid. Call light within reach, bed in the lowest position.  Taken 8/14/2023 0546 by Dalia Rosa RN  Plan of Care Reviewed With: patient  Goal: Patient-Specific Goal (Individualized)  Outcome: Ongoing, Progressing  Goal: Absence of Hospital-Acquired Illness or Injury  Outcome: Ongoing, Progressing  Intervention: Identify and Manage Fall Risk  Recent Flowsheet Documentation  Taken 8/14/2023 1700 by Brissa Norris RN  Safety Promotion/Fall Prevention: safety round/check completed  Taken 8/14/2023 1317 by Brissa Norris RN  Safety Promotion/Fall Prevention: safety round/check completed  Taken 8/14/2023 0900 by Brissa Norris RN  Safety Promotion/Fall Prevention: safety round/check completed  Intervention: Prevent Skin Injury  Recent Flowsheet Documentation  Taken 8/14/2023 0842 by Brissa Norris RN  Skin Protection:   adhesive use limited   tubing/devices free from skin contact   incontinence pads utilized  Intervention: Prevent and Manage VTE (Venous Thromboembolism) Risk  Recent Flowsheet Documentation  Taken 8/14/2023 0842 by Brissa Norris RN  VTE Prevention/Management: (see MAR) other (see comments)  Intervention: Prevent Infection  Recent Flowsheet Documentation  Taken 8/14/2023 1700 by Brissa Norris RN  Infection Prevention:   rest/sleep promoted   single patient room provided  Taken 8/14/2023 1317 by Brissa Norris RN  Infection Prevention:   rest/sleep promoted   single patient room provided  Taken 8/14/2023 0900 by Brissa Norris RN  Infection Prevention:   rest/sleep promoted   single patient room provided  Goal: Optimal  Comfort and Wellbeing  Outcome: Ongoing, Progressing  Intervention: Provide Person-Centered Care  Recent Flowsheet Documentation  Taken 8/14/2023 1430 by Brissa Norris RN  Trust Relationship/Rapport:   care explained   choices provided   emotional support provided   empathic listening provided   questions encouraged   reassurance provided   thoughts/feelings acknowledged   questions answered  Taken 8/14/2023 0842 by Brissa Norris RN  Trust Relationship/Rapport:   choices provided   emotional support provided   questions answered   questions encouraged   reassurance provided   thoughts/feelings acknowledged   empathic listening provided   care explained  Goal: Readiness for Transition of Care  Outcome: Ongoing, Progressing     Problem: Skin Injury Risk Increased  Goal: Skin Health and Integrity  Outcome: Ongoing, Progressing  Intervention: Optimize Skin Protection  Recent Flowsheet Documentation  Taken 8/14/2023 1430 by Brissa Norris RN  Pressure Reduction Devices: pressure-redistributing mattress utilized  Taken 8/14/2023 0842 by Brissa Norris RN  Pressure Reduction Techniques: frequent weight shift encouraged  Pressure Reduction Devices: pressure-redistributing mattress utilized  Skin Protection:   adhesive use limited   tubing/devices free from skin contact   incontinence pads utilized     Problem: Fall Injury Risk  Goal: Absence of Fall and Fall-Related Injury  Outcome: Ongoing, Progressing  Intervention: Identify and Manage Contributors  Recent Flowsheet Documentation  Taken 8/14/2023 1700 by Brissa Norris, RN  Medication Review/Management: medications reviewed  Taken 8/14/2023 1317 by Brissa Norris RN  Medication Review/Management: medications reviewed  Taken 8/14/2023 0900 by Brissa Norris, RN  Medication Review/Management: medications reviewed  Intervention: Promote Injury-Free Environment  Recent Flowsheet Documentation  Taken 8/14/2023 1700 by Brissa Norris, RN  Safety Promotion/Fall Prevention: safety  round/check completed  Taken 8/14/2023 1317 by Brissa Norris, RN  Safety Promotion/Fall Prevention: safety round/check completed  Taken 8/14/2023 0900 by Brissa Norris, RN  Safety Promotion/Fall Prevention: safety round/check completed   Goal Outcome Evaluation:           Progress: improving  Outcome Evaluation: VSS. Patient is on room air. Patient has ran bradycardic throughout the shift. She has been up to bedside commode. Site check performed. Swallow eval performed, patient is now on nectar thick liquid. Call light within reach, bed in the lowest position.

## 2023-08-14 NOTE — Clinical Note
Second inflation of balloon - Max pressure = 16 lucas. 2nd Inflation of balloon - Duration = 13 seconds.

## 2023-08-14 NOTE — CASE MANAGEMENT/SOCIAL WORK
Discharge Planning Assessment   Ryan     Patient Name: Amanda Cruz  MRN: [id number]  Today's Date: 8/14/2023    Admit Date: 8/14/2023    Plan: SS received a consult for Advanced age (82), Discharge planning. SS spoke with pt and sister at bedside on this date. Pt lives alone at [address] [place] [address]. PCP is Joseph Vann. Pt does not utilize home health service or DME at this time. Pt states she has a living will (on file). Pt to return home with sister Linda at [address] [place] [address] in [facility] with sister to provide transportation. SS to follow and assist with discharge planning.   Discharge Needs Assessment       Row Name 08/14/23 1444       Living Environment    People in Home alone    Current Living Arrangements home    Potentially Unsafe Housing Conditions none    Primary Care Provided by self    Provides Primary Care For no one    Family Caregiver if Needed sibling(s)    Family Caregiver Names Linda Roman    Quality of Family Relationships helpful;involved;supportive    Able to Return to Prior Arrangements other (see comments)    Living Arrangement Comments Pt to return home with Linda roman at  [address] [place] [id number] in [facility].       Resource/Environmental Concerns    Resource/Environmental Concerns none    Transportation Concerns none       Transition Planning    Patient/Family Anticipates Transition to home with family    Patient/Family Anticipated Services at Transition none    Transportation Anticipated family or friend will provide       Discharge Needs Assessment    Equipment Currently Used at Home none    Anticipated Changes Related to Illness none    Equipment Needed After Discharge none           Discharge Plan       Row Name 08/14/23 1446       Plan    Plan SS received a consult for Advanced age (82), Discharge planning. SS spoke with pt and sister at bedside on this date. Pt lives alone at [address] [place] [address],  [place] [address]. PCP is Joseph Vann. Pt does not utilize home health service or DME at this time. Pt states she has a living will (on file). Pt to return home with sister Linda at [address] [place] [address] in [facility] with sister to provide transportation. SS to follow and assist with discharge planning.    Patient/Family in Agreement with Plan yes          Continued Care and Services - Admitted Since 8/14/2023    Coordination has not been started for this encounter.       Expected Discharge Date and Time       Expected Discharge Date Expected Discharge Time    Aug 16, 2023            Demographic Summary       Row Name 08/14/23 1448       General Information    Admission Type inpatient    Referral Source nursing    Reason for Consult discharge planning  SS received a consult for Advanced age (82), Discharge planning.    Preferred Language English                LIONEL Greene

## 2023-08-14 NOTE — H&P
.      Patient Identification:  Name:  Amanda Cruz  Age:  82 y.o.  Sex:  female  :  1940  MRN:  [id number]   Visit Number:  [id number]  Primary Care Physician:  Joseph Vann MD    Chief complaint:   Chest pain  History of presenting illness:    Patient is a pleasant 82-year-old female past medical history significant for hypertension, hyperlipidemia, type 2 diabetes, gastroesophageal reflux disease, hypothyroidism.  Patient was transferred from outside facility after she presented there with complaints of chest pain.  Patient was diagnosed with ST elevation MI by outside ER and was sent to our facility.  Patient apparently had a choking spell earlier in the day and was having discomfort in the neck and jaw area for few hours prior to coming in.  She has had some discomfort and pressure in the chest off and on for the past few days.  As per patient she sometimes chokes on food.  She denies any dyspnea on exertion proximal dyspnea palpitations or blackouts.  No nausea vomiting.  Patient has had EGD done in the past in 2018 also.    ROS: All systems reviewed and negative except as mentioned above.     ---------------------------------------------------------------------------------------------------------------------   Past Medical History:   Diagnosis Date    Arthritis     Diabetes mellitus     Disease of thyroid gland     Elevated cholesterol     GERD (gastroesophageal reflux disease)     Hyperlipidemia     Hypertension     Smell and taste disorder      Past Surgical History:   Procedure Laterality Date    ANKLE SURGERY      COLONOSCOPY      ENDOSCOPY N/A 10/24/2018    Procedure: ESOPHAGOGASTRODUODENOSCOPY WITH BIOPSY CPT CODE: 50992;  Surgeon: Mark Hardin MD;  Location: [facility] OR;  Service: Gastroenterology    HYSTERECTOMY      partial     Family History   Problem Relation Age of Onset    Cancer Mother     Alzheimer's disease Mother     Uterine cancer Mother     Uterine cancer Maternal  Grandmother      Social History     Socioeconomic History    Marital status:    Tobacco Use    Smoking status: Never    Smokeless tobacco: Never   Substance and Sexual Activity    Alcohol use: No    Drug use: No    Sexual activity: Defer     ---------------------------------------------------------------------------------------------------------------------   Allergies:  Ancef [cefazolin] and Rocephin [ceftriaxone]  ---------------------------------------------------------------------------------------------------------------------    Cannot display prior to admission medications because the patient has not been admitted in this contact.          [facility] Scheduled Meds:    No current facility-administered medications for this encounter.    ---------------------------------------------------------------------------------------------------------------------   Vital Signs:         08/14/23  0025   Weight: 66.8 kg (147 lb 4.6 oz)     Body mass index is 24.89 kg/mý.  ---------------------------------------------------------------------------------------------------------------------   Physical Exam:  Constitutional:  Well-developed and well-nourished.  No respiratory distress.      HENT:  Head: Normocephalic and atraumatic.  Mouth:  Moist mucous membranes.    Eyes:  Conjunctivae and EOM are normal.  Pupils are equal, round, and reactive to light.  No scleral icterus.  Neck:  Neck supple.  No JVD present.    Cardiovascular:  Normal rate, regular rhythm and normal heart sounds with no murmur.  Pulmonary/Chest:  No respiratory distress, no wheezes, no crackles, with normal breath sounds and good air movement.  Abdominal:  Soft.  Bowel sounds are normal.  No distension and no tenderness.   Musculoskeletal:  No edema, no tenderness, and no deformity.  No red or swollen joints anywhere.    Neurological:  Alert and oriented to person, place, and time.  No cranial nerve deficit.  No tongue deviation.  No facial droop.   No slurred speech.   Skin:  Skin is warm and dry.  No rash noted.  No pallor.   Psychiatric:  Normal mood and affect.  Behavior is normal.  Judgment and thought content normal.   Peripheral vascular:  No edema and strong pulses on all 4 extremities.  ---------------------------------------------------------------------------------------------------------------------  EKG: EKG from outside facility was reviewed.  Patient had sinus rhythm with septal infarction ST depression inferior leads.  J-point elevation noted in the anterior lead  Telemetry: Sinus rhythm  I have personally looked at both the EKG and the telemetry strips.  ---------------------------------------------------------------------------------------------------------------------                 Invalid input(s): PROTCrCl cannot be calculated (No successful lab value found.).  No results found for: AMMONIA          No results found for: HGBA1C  No results found for: TSH, FREET4  No results found for: PREGTESTUR, PREGSERUM, HCG, HCGQUANT  Pain Management Panel           No data to display              No results found for: BLOODCX  No results found for: URINECX  No results found for: WOUNDCX  No results found for: STOOLCX      ---------------------------------------------------------------------------------------------------------------------  Imaging Results (Last 7 Days)       ** No results found for the last 168 hours. **            I have personally reviewed the radiology images and read the final radiology report.  ---------------------------------------------------------------------------------------------------------------------  Assessment and Plan:   #1 cardiac.  Patient with multiple risk factors for heart disease.  Came in with unstable angina symptom.  Patient was deemed to be a STEMI as per outside ER facility.  EKG does not look like a STEMI.  Due to worsening symptoms patient underwent a cath.  She did require PCI to the distal circumflex  and right coronary artery.  We will treat her with dual antiplatelet therapy.  Lipid-lowering medication.  Patient will be started on beta-blockers and ACE inhibitors as tolerated.  #2 GI.  Patient has been having some swallow issues and will get a formal swallow evaluation.  #3 diabetes.  Will check A1c.  We will review her medication to see if she is on any outpatient medications.  #4 hypothyroidism.  We will review her records to see if on any medications.          This document has been electronically signed by Phu Ramírez MD [facility], Interventional Cardiology  August 14, 2023 01:15 EDT

## 2023-08-14 NOTE — Clinical Note
Second inflation of balloon - Max pressure = 18 lucas. 2nd Inflation of balloon - Duration = 14 seconds.

## 2023-08-14 NOTE — PROGRESS NOTES
.Patient Identification:  Name:  Amanda Cruz  Age:  82 y.o.  Sex:  female  :  1940  MRN:  [id number]  Visit Number:  [id number]    Chief Complaint:   Chest pains    Subjective:    Patient feeling better denies any chest pains.  Patient had swallow evaluation done today.  She was noted to have mild pharyngeal dysphagia with intermittent aspiration of thin liquids was silent in nature.  It was recommended to modify p.o. diet of regular consistency nectar thick liquids water protocol no mixed consistencies  ----------------------------------------------------------------------------------------------------------------------  Current Hospital Meds:  aspirin, 81 mg, Oral, Daily  docusate sodium, 100 mg, Oral, BID  fluticasone, 1 spray, Nasal, BID  glipizide, 5 mg, Oral, Daily  levothyroxine, 75 mcg, Oral, Daily  losartan, 50 mg, Oral, Daily  pantoprazole, 40 mg, Oral, Q AM  PARoxetine, 10 mg, Oral, QAM  rosuvastatin, 20 mg, Oral, Nightly  ticagrelor, 90 mg, Oral, BID         ----------------------------------------------------------------------------------------------------------------------  Vital Signs:  Temp:  [97.9 øF (36.6 øC)-98.3 øF (36.8 øC)] 97.9 øF (36.6 øC)  Heart Rate:  [52-67] 60  Resp:  [] 20  BP: ()/(49-87) 106/56      23  0025 23  0111 23  0400   Weight: 66.8 kg (147 lb 4.6 oz) 67.6 kg (149 lb 0.5 oz) 67.6 kg (149 lb 0.5 oz)     Body mass index is 25.58 kg/mý.    Intake/Output Summary (Last 24 hours) at 2023 0936  Last data filed at 2023 0800  Gross per 24 hour   Intake 253.6 ml   Output 600 ml   Net -346.4 ml     NPO Diet NPO Type: Sips with Meds  ----------------------------------------------------------------------------------------------------------------------  Physical exam:  Constitutional:    HENT:  Head:  Normocephalic and atraumatic.    Eyes:  Conjunctivae and EOM are normal.  Pupils are equal, round, and reactive to light.  No scleral  icterus.    Neck:  Neck supple.  No JVD present.    Cardiovascular: Normal rate, regular rhythm, S1 S2+, NO S3 / S4  Pulmonary/Chest:  Vesicular breath sounds B/L  Abdominal:  Soft.  Bowel sounds are normal.  No distension and no tenderness.      Neurological:  Alert and oriented to person, place, and time. No focal deficits.  Skin:  Skin is warm and dry. No rash noted. No pallor.   Musculoskeletal:  No edema, no tenderness, and no deformity.  No red or swollen joints anywhere.   Peripheral vascular:  2+ Pulses B/L DP  ----------------------------------------------------------------------------------------------------------------------    ----------------------------------------------------------------------------------------------------------------------  Results from last 7 days   Lab Units 08/14/23 0314 08/14/23 0134   HSTROP T ng/L 386* 343*     Results from last 7 days   Lab Units 08/14/23 0135   WBC 10*3/mm3 12.52*   HEMOGLOBIN g/dL 12.9   HEMATOCRIT % 37.4   MCV fL 86.2   MCHC g/dL 34.5   PLATELETS 10*3/mm3 174         Results from last 7 days   Lab Units 08/14/23 0134   SODIUM mmol/L 136   POTASSIUM mmol/L 4.7   CHLORIDE mmol/L 102   CO2 mmol/L 23.3   BUN mg/dL 24*   CREATININE mg/dL 1.24*   CALCIUM mg/dL 9.2   GLUCOSE mg/dL 128*   Estimated Creatinine Clearance: 34.2 mL/min (A) (by C-G formula based on SCr of 1.24 mg/dL (H)).    No results found for: AMMONIA  Results from last 7 days   Lab Units 08/14/23  0134   CHOLESTEROL mg/dL 92   TRIGLYCERIDES mg/dL 43   HDL CHOL mg/dL 41   LDL CHOL mg/dL 39     No results found for: BLOODCX  No results found for: URINECX  No results found for: WOUNDCX  No results found for: STOOLCX  Echo:    @EKG: From 8/14/2023 showed sinus bradycardia rate of 59, anterior infarction, improvement of ST depression in inferior leads        I have personally looked at the labs and they are summarized  above.  ----------------------------------------------------------------------------------------------------------------------  Imaging Results (Last 24 Hours)       ** No results found for the last 24 hours. **          ----------------------------------------------------------------------------------------------------------------------    Assessment:  ACS with PCI of the right coronary artery and circumflex artery  Dysphagia  Hyperlipidemia  Type 2 diabetes    Plan:  #1 cardiac.  Patient with acute coronary syndrome, non-Q wave MI.  Patient underwent PTCA and stent placement of the right coronary artery and circumflex artery.  Dual antiplatelet therapy to continue.  Lipid-lowering medication to continue.  We will continue angiotensin receptor blocker also.  2. dysphagia.  Patient with silent aspiration and change of consistency of food advised.        This document has been electronically signed by Phu Ramírez MD [facility], Interventional Cardiology  August 14, 2023 09:36 EDT

## 2023-08-14 NOTE — PHARMACY PATIENT ASSISTANCE
Pharmacy reviewed cost and coverage for Brilinta. According to her insurance she should have a co-pay around $85 if purchased in our apothecary. Spoke with patients daughter and she mentioned concerns for affordability. She is okay with using a free trial card for the first 30 days then discussing with provider about more affordable alternatives such as plavix.     Thank you,  Emil Dickerson, PharmD  08/14/23  10:24 EDT

## 2023-08-15 VITALS
TEMPERATURE: 98 F | HEIGHT: 64 IN | OXYGEN SATURATION: 95 % | DIASTOLIC BLOOD PRESSURE: 104 MMHG | HEART RATE: 73 BPM | BODY MASS INDEX: 24.43 KG/M2 | SYSTOLIC BLOOD PRESSURE: 137 MMHG | WEIGHT: 143.08 LBS | RESPIRATION RATE: 16 BRPM

## 2023-08-15 LAB
GLUCOSE BLDC GLUCOMTR-MCNC: 103 MG/DL (ref 70–130)
GLUCOSE BLDC GLUCOMTR-MCNC: 91 MG/DL (ref 70–130)
QT INTERVAL: 420 MS
QT INTERVAL: 420 MS
QT INTERVAL: 444 MS
QT INTERVAL: 474 MS
QTC INTERVAL: 412 MS
QTC INTERVAL: 436 MS
QTC INTERVAL: 439 MS
QTC INTERVAL: 461 MS
TROPONIN T SERPL HS-MCNC: 180 NG/L

## 2023-08-15 PROCEDURE — 99239 HOSP IP/OBS DSCHRG MGMT >30: CPT | Performed by: INTERNAL MEDICINE

## 2023-08-15 PROCEDURE — 93005 ELECTROCARDIOGRAM TRACING: CPT | Performed by: INTERNAL MEDICINE

## 2023-08-15 PROCEDURE — 84484 ASSAY OF TROPONIN QUANT: CPT | Performed by: INTERNAL MEDICINE

## 2023-08-15 PROCEDURE — 82948 REAGENT STRIP/BLOOD GLUCOSE: CPT

## 2023-08-15 PROCEDURE — 93010 ELECTROCARDIOGRAM REPORT: CPT | Performed by: INTERNAL MEDICINE

## 2023-08-15 PROCEDURE — 92526 ORAL FUNCTION THERAPY: CPT

## 2023-08-15 RX ORDER — LOSARTAN POTASSIUM 25 MG/1
25 TABLET ORAL DAILY
Status: DISCONTINUED | OUTPATIENT
Start: 2023-08-15 | End: 2023-08-15 | Stop reason: HOSPADM

## 2023-08-15 RX ORDER — LOSARTAN POTASSIUM 25 MG/1
25 TABLET ORAL DAILY
Qty: 30 TABLET | Refills: 12 | Status: SHIPPED | OUTPATIENT
Start: 2023-08-16

## 2023-08-15 RX ORDER — RANOLAZINE 500 MG/1
500 TABLET, EXTENDED RELEASE ORAL 2 TIMES DAILY
Qty: 60 TABLET | Refills: 0 | Status: SHIPPED | OUTPATIENT
Start: 2023-08-15

## 2023-08-15 RX ORDER — RANOLAZINE 500 MG/1
500 TABLET, EXTENDED RELEASE ORAL EVERY 12 HOURS SCHEDULED
Status: DISCONTINUED | OUTPATIENT
Start: 2023-08-15 | End: 2023-08-15 | Stop reason: SDUPTHER

## 2023-08-15 RX ORDER — RANOLAZINE 500 MG/1
500 TABLET, EXTENDED RELEASE ORAL EVERY 12 HOURS SCHEDULED
Status: DISCONTINUED | OUTPATIENT
Start: 2023-08-15 | End: 2023-08-15 | Stop reason: HOSPADM

## 2023-08-15 RX ADMIN — FLUTICASONE PROPIONATE 1 SPRAY: 50 SPRAY, METERED NASAL at 08:49

## 2023-08-15 RX ADMIN — PAROXETINE 10 MG: 10 TABLET, FILM COATED ORAL at 06:07

## 2023-08-15 RX ADMIN — LEVOTHYROXINE SODIUM 75 MCG: 0.07 TABLET ORAL at 08:46

## 2023-08-15 RX ADMIN — ASPIRIN 81 MG: 81 TABLET, COATED ORAL at 08:45

## 2023-08-15 RX ADMIN — TICAGRELOR 90 MG: 90 TABLET ORAL at 08:46

## 2023-08-15 RX ADMIN — DOCUSATE SODIUM 100 MG: 100 CAPSULE, LIQUID FILLED ORAL at 08:46

## 2023-08-15 RX ADMIN — PANTOPRAZOLE SODIUM 40 MG: 40 TABLET, DELAYED RELEASE ORAL at 06:07

## 2023-08-15 RX ADMIN — RANOLAZINE 500 MG: 500 TABLET, FILM COATED, EXTENDED RELEASE ORAL at 12:16

## 2023-08-15 RX ADMIN — GLIPIZIDE 5 MG: 5 TABLET ORAL at 08:46

## 2023-08-15 NOTE — PLAN OF CARE
Goal Outcome Evaluation:       Problem: Adult Inpatient Plan of Care  Goal: Plan of Care Review  Outcome: Adequate for Care Transition  Goal: Patient-Specific Goal (Individualized)  Outcome: Adequate for Care Transition  Goal: Absence of Hospital-Acquired Illness or Injury  Outcome: Adequate for Care Transition  Intervention: Prevent Skin Injury  Recent Flowsheet Documentation  Taken 8/15/2023 0845 by Brissa Norris RN  Skin Protection:   adhesive use limited   tubing/devices free from skin contact   incontinence pads utilized  Intervention: Prevent and Manage VTE (Venous Thromboembolism) Risk  Recent Flowsheet Documentation  Taken 8/15/2023 0845 by Brissa Norris RN  VTE Prevention/Management: (see MAR) other (see comments)  Goal: Optimal Comfort and Wellbeing  Outcome: Adequate for Care Transition  Intervention: Provide Person-Centered Care  Recent Flowsheet Documentation  Taken 8/15/2023 0845 by Brissa Norris RN  Trust Relationship/Rapport:   care explained   choices provided   emotional support provided   empathic listening provided   questions encouraged   thoughts/feelings acknowledged   reassurance provided   questions answered  Goal: Readiness for Transition of Care  Outcome: Adequate for Care Transition     Problem: Skin Injury Risk Increased  Goal: Skin Health and Integrity  Outcome: Adequate for Care Transition  Intervention: Promote and Optimize Oral Intake  Recent Flowsheet Documentation  Taken 8/15/2023 0845 by Brissa Norris RN  Oral Nutrition Promotion: rest periods promoted  Intervention: Optimize Skin Protection  Recent Flowsheet Documentation  Taken 8/15/2023 0845 by Brissa Norris RN  Pressure Reduction Techniques: frequent weight shift encouraged  Pressure Reduction Devices: pressure-redistributing mattress utilized  Skin Protection:   adhesive use limited   tubing/devices free from skin contact   incontinence pads utilized     Problem: Fall Injury Risk  Goal: Absence of Fall and Fall-Related  Injury  Outcome: Adequate for Care Transition

## 2023-08-15 NOTE — DISCHARGE INSTR - APPOINTMENTS
Follow up appointments;     Follow up with PCP on August 22, @ 1:30 PM     Follow up with Cardiology on August 29, @ 2:00 PM

## 2023-08-15 NOTE — NURSING NOTE
Order received for Cardiac Rehab Consultation.     Patient is not interested at this time but if she changes her mind she will contact us.        Information discussed with: Patient        Educated on: Benefits of Exercise,  Educated on Cardiac Rehab and Program Protocol, Brochure and/or educational material provided, Contact information given, and Teach Back Verified      Thank you for the referral. Please contact the Cardiac Rehab Dept. (ext. [phone]) with any further questions or concerns.

## 2023-08-15 NOTE — DISCHARGE SUMMARY
.Patient Identification:  Name:  Amanda Cruz  Age:  82 y.o.  Sex:  female  :  1940  MRN:  [id number]  Visit Number:  [id number]    Date of Admission: 2023  Date of Discharge:       PCP: Joseph Vann MD    DISCHARGE DIAGNOSIS  Acute coronary syndrome, non-Q wave MI    CONSULTS       PROCEDURES PERFORMED  Left heart cardiac catheterization, PTCA and stent placement of the circumflex artery and right coronary artery    HOSPITAL COURSE  Patient is a 82 y.o. female presented [facility] complaining of chest pains.  Please see the admitting history and physical for further details.    Patient had evaluation done for possible aspiration.  She had formal swallow evaluation done.  She was noted to have silent aspiration with liquids and was advised to take concentrated liquids    VITAL SIGNS:      23  0111 23  0400 08/15/23  0400   Weight: 67.6 kg (149 lb 0.5 oz) 67.6 kg (149 lb 0.5 oz) 64.9 kg (143 lb 1.3 oz)     Body mass index is 24.56 kg/mý.    PHYSICAL EXAM:  Constitutional:  Well-developed and well-nourished.  No respiratory distress.      HENT:  Head: Normocephalic and atraumatic.  Mouth:  Moist mucous membranes.    Eyes:  Conjunctivae and EOM are normal.  Pupils are equal, round, and reactive to light.  No scleral icterus.  Neck:  Neck supple.  No JVD present.    Cardiovascular:  Normal rate, regular rhythm and normal heart sounds with no murmur.  Pulmonary/Chest:  No respiratory distress, no wheezes, no crackles, with normal breath sounds and good air movement.  Abdominal:  Soft.  Bowel sounds are normal.  No distension and no tenderness.   Musculoskeletal:  No edema, no tenderness, and no deformity.  No red or swollen joints anywhere.    Neurological:  Alert and oriented to person, place, and time.  No cranial nerve deficit.  No tongue deviation.  No facial droop.  No slurred speech.   Skin:  Skin is warm and dry.  No rash noted.  No pallor.   Psychiatric:  Normal  mood and affect.  Behavior is normal.  Judgment and thought content normal.   Peripheral vascular:  No edema and strong pulses on all 4 extremities.    DISCHARGE DISPOSITION   Stable    DISCHARGE MEDICATIONS:     Discharge Medications        New Medications        Instructions Start Date   ticagrelor 90 MG tablet tablet  Commonly known as: BRILINTA   90 mg, Oral, 2 Times Daily             Changes to Medications        Instructions Start Date   losartan 25 MG tablet  Commonly known as: COZAAR  What changed:   medication strength  how much to take   25 mg, Oral, Daily   Start Date: August 16, 2023            Continue These Medications        Instructions Start Date   aspirin 81 MG EC tablet   81 mg, Oral, Daily      atorvastatin 80 MG tablet  Commonly known as: LIPITOR   80 mg, Oral, Nightly      busPIRone 10 MG tablet  Commonly known as: BUSPAR   10 mg, Oral, 2 Times Daily      furosemide 20 MG tablet  Commonly known as: LASIX   20 mg, Oral, Daily      glipizide 5 MG tablet  Commonly known as: GLUCOTROL   5 mg, Oral, Daily      levothyroxine 75 MCG tablet  Commonly known as: SYNTHROID, LEVOTHROID   75 mcg, Oral, Daily      meclizine 25 MG tablet  Commonly known as: ANTIVERT   25 mg, Oral, 3 Times Daily PRN      metoprolol succinate XL 50 MG 24 hr tablet  Commonly known as: TOPROL-XL   50 mg, Oral, Daily      oxybutynin 5 MG tablet  Commonly known as: DITROPAN   5 mg, Oral, Daily      PARoxetine 10 MG tablet  Commonly known as: PAXIL   10 mg, Oral, Every Morning      sulindac 200 MG tablet  Commonly known as: CLINORIL   200 mg, Oral, 2 Times Daily      traZODone 50 MG tablet  Commonly known as: DESYREL   50 mg, Oral, Nightly      triamcinolone 0.1 % cream  Commonly known as: KENALOG   1 application , Topical, 2 Times Daily PRN                Results for orders placed during the hospital encounter of 08/14/23    Adult Transthoracic Echo Complete W/ Cont if Necessary Per Protocol    Interpretation Summary    Left  ventricular systolic function is normal. Calculated left ventricular EF = 57% Left ventricular ejection fraction appears to be 56 - 60%.    Left ventricular diastolic function is consistent with (grade I) impaired relaxation.    Mild aortic valve stenosis is present.    Estimated right ventricular systolic pressure from tricuspid regurgitation is normal (<35 mmHg).     Results for orders placed during the hospital encounter of 08/14/23    Cardiac Catheterization/Vascular Study    Narrative    Dist Cx lesion is 99% stenosed.    Mid RCA lesion is 90% stenosed.    1.  Cardiac.  Patient with PTCA and stent placement of the distal circumflex and right coronary artery.  Dual antiplatelet therapy to continue.  Lipid-lowering medication will be advised.  Patient will need swallow evaluation as she is having some swallow issues also.    Procedures            Follow-up Information       Phu Ramírez MD Follow up in 2 week(s).    Specialties: Cardiology, Interventional Cardiology  Why: As needed  Contact information:  [address] [address] [phone]                                This document has been electronically signed by Phu Ramírez MD FACC, Interventional Cardiology  August 15, 2023 10:26 EDT    Please note that this discharge summary required more than 30 minutes to complete.

## 2023-08-15 NOTE — PLAN OF CARE
Goal Outcome Evaluation:  Plan of Care Reviewed With: patient        Progress: improving  Outcome Evaluation: Pt remains A+Ox4. VSS, afebrile, on RA. Pt has been bradycardiac this shift, MD aware. Pt has ambulated this shift. Bed in lowest position, alarm off. Pt educated on the risks of having the alarm off and risks for falls. Stated she would let us know if she wanted to get up. Call light in reach. Pt denies any requests at this time.

## 2023-08-15 NOTE — CASE MANAGEMENT/SOCIAL WORK
Discharge Planning Assessment  [facility]     Patient Name: Amanda Cruz  MRN: [id number]  Today's Date: 8/15/2023    Admit Date: 8/14/2023    Plan: SS received a consult for Advanced age (82), Discharge planning. SS spoke with pt and sister at bedside on this date. Pt lives alone at [address] [place] [address]. PCP is Joseph Vann. Pt does not utilize home health service or DME at this time. Pt states she has a living will (on file). Pt to return home with sister Linda at [address] [place] [address] in [facility] with sister to provide transportation. SS to follow and assist with discharge planning.   Discharge Plan       Row Name 08/15/23 1033       Plan    Final Note Pt is being discharged home on this date. Sister, Linda to provide transportation. No other needs identified at this time.      Row Name 08/15/23 1031       Plan    Final Discharge Disposition Code 01 - home or self-care                    LOINEL Greene

## 2023-08-15 NOTE — THERAPY TREATMENT NOTE
Acute Care - Speech Language Pathology   Swallow Treatment Note [facility]     Patient Name: Amanda Cruz  : 1940  MRN: [id number]  Today's Date: 8/15/2023             Admit Date: 2023    Visit Dx:     ICD-10-CM ICD-9-CM   1. Abnormal swallowing  R13.10 787.20     Patient Active Problem List   Diagnosis    Gastroesophageal reflux disease    Dysphagia    Abnormal swallowing    Urinary incontinence    Acute coronary syndrome with high troponin     Past Medical History:   Diagnosis Date    Arthritis     Diabetes mellitus     Disease of thyroid gland     Elevated cholesterol     GERD (gastroesophageal reflux disease)     Hyperlipidemia     Hypertension     Smell and taste disorder      Past Surgical History:   Procedure Laterality Date    ANKLE SURGERY      CARDIAC CATHETERIZATION N/A 2023    Procedure: Left Heart Cath;  Surgeon: Phu Ramírez MD;  Location: [facility] INVASIVE LOC[facility];  Service: Cardiovascular;  Laterality: N/A;    CARDIAC CATHETERIZATION N/A 2023    Procedure: Percutaneous Coronary Intervention;  Surgeon: Phu Ramírez MD;  Location: [facility] INVASIVE LOCATION;  Service: Cardiovascular;  Laterality: N/A;    COLONOSCOPY      ENDOSCOPY N/A 10/24/2018    Procedure: ESOPHAGOGASTRODUODENOSCOPY WITH BIOPSY CPT CODE: 46268;  Surgeon: Mark Hardin MD;  Location: [facility];  Service: Gastroenterology    HYSTERECTOMY      partial     DYSPHAGIA THERAPY NOTE:     Amanda Cruz is seen at bedside am for formal dysphagia therapy and discharge education/training. Her sister is present at bedside during this session.    Ms. Cruz is s/p MBS 23 w/ intermittent aspiration of thin liquids. She was recommended a modified po diet of regular consistency, nectar thick liquids, water protocol between meals and meds, formal dysphagia tx per poc.     MD is present briefly during this session for f/u, reporting pt is discharging home this am. D/w Dr. Ramírez pt swallowing  fnx, recommendations for modified diet and outpatient SLP services to continue dysphagia tx upon discharge w/ verbal understanding and agreement.      Long Term Goal:  Patient will accept least restrictive diet tolerance w/o overt s/s aspiration.      Short Term Goals:  1. Patient perform resistive breathing exercises x7sets x7 reps w/ resistance of 1-5 to improve respiratory support and control.   *x1 sets x7 reps w/ min cues, inhale/exhale level 2/2. Fatigue effects noted.     2. Patient will perform laryngeal adduction/elevation exercises x3 sets x10 reps w/ min cues.   *x1 sets x3 reps w/ mod model and cues, mean phonation duration approximately 7 seconds.     3. Patient will perform CTAR exercises repetitive x3 sets x12 reps w/ mod cues.   *x2 sets x10 reps w/ min cues.     4. Patient will accept ice/chip water per protocol between meals and medications for oral hydration/comfort with assistance.   *Not directly addressed today. D/w Ms. Cruz and her sister at length recommendation and rationale for water protocol w/ both in verbal understanding and agreement.     5. Patient will participate in instrumental re-evaluation of swallowing fnx in 3-7 days, pending progress towards this poc.  *Continue modified diet and dysphagia tx via outpatient services upon discharge.      NOTE: D/w pt and pt's sister and length results and recommendations from MBS, provided extensive modified diet training, sample instant thickener, instructional/educational handouts. Pt and sister verbalize understanding and agreement.     SLP Recommendation and Plan  1. Regular consistency, NECTAR thick liquids. No mixed consistencies.   2. Medications whole in puree/nectars.  3. Water protocol between meals/meds.   4. Universal aspiration precautions.   5. SAMUEL precautions.   6. Oral care protocol.   7. No ice cream, milkshakes or jello.   8. Dysphagia tx as tolerated via OP.      D/w patient and sister current status and recommendations  w/ verbal understanding and agreement.      D/w RN and MD pt status and recommendations w/ verbal understanding and agreement.      Thank you for allowing me to participate in the care of your patient-  Mari Norris M.A., CCC-SLP      EDUCATION  The patient has been educated in the following areas:   Dysphagia (Swallowing Impairment) Oral Care/Hydration Modified Diet Instruction.     Time Calculation:     Therapy Charges for Today       Code Description Service Date Service Provider Modifiers Qty    86344634612 HC ST MOTION FLUORO EVAL SWALLOW 8 8/14/2023 Mari Norris MA,CCC-SLP GN 1    [id number] HC ST TREATMENT SWALLOW 4 8/15/2023 Mari Norris MA,CCC-SLP GN 1          Mari Norris MA,CCC-SLP  8/15/2023

## 2023-08-16 ENCOUNTER — TELEPHONE (OUTPATIENT)
Dept: PHYSICAL THERAPY | Facility: CLINIC | Age: 83
End: 2023-08-16
Payer: MEDICARE

## 2023-08-16 NOTE — TELEPHONE ENCOUNTER
Spoke w/ pt about outpatient dysphagia therapy. She reports she will have her sister call back.    Thank you,    Jannette Henriquez M.A., CCC-SLP

## 2023-08-29 ENCOUNTER — LAB (OUTPATIENT)
Dept: LAB | Facility: HOSPITAL | Age: 83
End: 2023-08-29
Payer: MEDICARE

## 2023-08-29 ENCOUNTER — OFFICE VISIT (OUTPATIENT)
Dept: CARDIOLOGY | Facility: CLINIC | Age: 83
End: 2023-08-29
Payer: MEDICARE

## 2023-08-29 VITALS
BODY MASS INDEX: 22.77 KG/M2 | HEIGHT: 64 IN | OXYGEN SATURATION: 95 % | HEART RATE: 84 BPM | SYSTOLIC BLOOD PRESSURE: 156 MMHG | WEIGHT: 133.4 LBS | DIASTOLIC BLOOD PRESSURE: 80 MMHG

## 2023-08-29 DIAGNOSIS — I25.10 CORONARY ARTERY DISEASE INVOLVING NATIVE CORONARY ARTERY OF NATIVE HEART WITHOUT ANGINA PECTORIS: ICD-10-CM

## 2023-08-29 DIAGNOSIS — I25.10 CORONARY ARTERY DISEASE INVOLVING NATIVE CORONARY ARTERY OF NATIVE HEART WITHOUT ANGINA PECTORIS: Primary | ICD-10-CM

## 2023-08-29 LAB
ANION GAP SERPL CALCULATED.3IONS-SCNC: 11.9 MMOL/L (ref 5–15)
BUN SERPL-MCNC: 37 MG/DL (ref 8–23)
BUN/CREAT SERPL: 24.3 (ref 7–25)
CALCIUM SPEC-SCNC: 9.6 MG/DL (ref 8.6–10.5)
CHLORIDE SERPL-SCNC: 100 MMOL/L (ref 98–107)
CO2 SERPL-SCNC: 25.1 MMOL/L (ref 22–29)
CREAT SERPL-MCNC: 1.52 MG/DL (ref 0.57–1)
EGFRCR SERPLBLD CKD-EPI 2021: 34.5 ML/MIN/1.73
GLUCOSE SERPL-MCNC: 98 MG/DL (ref 65–99)
MAGNESIUM SERPL-MCNC: 2.3 MG/DL (ref 1.6–2.4)
POTASSIUM SERPL-SCNC: 4.1 MMOL/L (ref 3.5–5.2)
SODIUM SERPL-SCNC: 137 MMOL/L (ref 136–145)

## 2023-08-29 PROCEDURE — 36415 COLL VENOUS BLD VENIPUNCTURE: CPT

## 2023-08-29 PROCEDURE — 80048 BASIC METABOLIC PNL TOTAL CA: CPT

## 2023-08-29 PROCEDURE — 83735 ASSAY OF MAGNESIUM: CPT

## 2023-08-29 RX ORDER — LACTULOSE 10 G/15ML
SOLUTION ORAL
COMMUNITY
Start: 2023-06-26

## 2023-08-29 RX ORDER — BUSPIRONE HYDROCHLORIDE 5 MG/1
10 TABLET ORAL DAILY
Qty: 30 TABLET | Refills: 12 | Status: SHIPPED | OUTPATIENT
Start: 2023-08-29

## 2023-08-29 NOTE — PROGRESS NOTES
Office Note    Subjective     Amanda Cruz is a 82 y.o. female who presents to day for follow-up post stents      Active Problems:  Problem List Items Addressed This Visit    None  Visit Diagnoses       Coronary artery disease involving native coronary artery of native heart without angina pectoris    -  Primary    Relevant Orders    ECG 12 Lead    Basic Metabolic Panel    Magnesium            HPI  Is a pleasant 82-year-old female past medical history significant for coronary disease status post PTCA stent placement to the right coronary artery and circumflex artery on August 14, 2023.  Patient has been doing well post stents but recently for the past few weeks has been having some dizziness lightheadedness.  She has been possibly overworking in her yard.  She has been prescribed meclizine 3 times a day.  She has had no chest pains.  No dyspnea on exertion no lower extremity edema.     PRIOR MEDS  Current Outpatient Medications on File Prior to Visit   Medication Sig Dispense Refill    aspirin 81 MG EC tablet Take 1 tablet by mouth Daily.      atorvastatin (LIPITOR) 80 MG tablet Take 1 tablet by mouth Every Night.      Constulose 10 GM/15ML solution TAKE 15ML BY MOUTH TWICE DAILY      glipiZIDE (GLUCOTROL) 5 MG tablet Take 1 tablet by mouth Daily.      levothyroxine (SYNTHROID, LEVOTHROID) 75 MCG tablet Take 1 tablet by mouth Daily.      losartan (COZAAR) 25 MG tablet Take 1 tablet by mouth Daily. 30 tablet 12    meclizine (ANTIVERT) 25 MG tablet Take 1 tablet by mouth 3 (Three) Times a Day As Needed for Dizziness.      oxybutynin (DITROPAN) 5 MG tablet Take 1 tablet by mouth Daily.      ticagrelor (BRILINTA) 90 MG tablet tablet Take 1 tablet by mouth 2 (Two) Times a Day. 60 tablet 12    traZODone (DESYREL) 50 MG tablet Take 1 tablet by mouth Every Night.      triamcinolone (KENALOG) 0.1 % cream Apply 1 application  topically to the appropriate area as directed 2 (Two) Times a Day As Needed for Irritation  "(applies to wrist).      [DISCONTINUED] busPIRone (BUSPAR) 10 MG tablet Take 1 tablet by mouth 2 (Two) Times a Day.      [DISCONTINUED] furosemide (LASIX) 20 MG tablet Take 1 tablet by mouth Daily.      [DISCONTINUED] ranolazine (RANEXA) 500 MG 12 hr tablet Take 1 tablet by mouth 2 (Two) Times a Day. 60 tablet 0    metoprolol succinate XL (TOPROL-XL) 50 MG 24 hr tablet Take 1 tablet by mouth Daily.      [DISCONTINUED] PARoxetine (PAXIL) 10 MG tablet Take 1 tablet by mouth Every Morning. (Patient not taking: Reported on 8/29/2023)       No current facility-administered medications on file prior to visit.       ALLERGIES  Ancef [cefazolin] and Rocephin [ceftriaxone]    HISTORY  Past Medical History:   Diagnosis Date    Arthritis     Diabetes mellitus     Disease of thyroid gland     Elevated cholesterol     GERD (gastroesophageal reflux disease)     Hyperlipidemia     Hypertension     Smell and taste disorder        Social History     Socioeconomic History    Marital status:    Tobacco Use    Smoking status: Never    Smokeless tobacco: Never   Vaping Use    Vaping Use: Never used   Substance and Sexual Activity    Alcohol use: No    Drug use: No    Sexual activity: Defer       Family History   Problem Relation Age of Onset    Cancer Mother     Alzheimer's disease Mother     Uterine cancer Mother     Uterine cancer Maternal Grandmother        Review of Systems   Respiratory:  Negative for shortness of breath.    Cardiovascular:  Negative for chest pain, palpitations and leg swelling.   Neurological:  Positive for dizziness and light-headedness.     Objective     VITALS: /80   Pulse 84   Ht 162.6 cm (64\")   Wt 60.5 kg (133 lb 6.4 oz)   SpO2 95%   BMI 22.90 kg/mý     LABS:   Admission on 08/14/2023, Discharged on 08/15/2023   Component Date Value Ref Range Status    TSH 08/14/2023 0.647  0.270 - 4.200 uIU/mL Final    Total Cholesterol 08/14/2023 92  0 - 200 mg/dL Final    Triglycerides 08/14/2023 43  0 " - 150 mg/dL Final    HDL Cholesterol 08/14/2023 41  40 - 60 mg/dL Final    LDL Cholesterol  08/14/2023 39  0 - 100 mg/dL Final    VLDL Cholesterol 08/14/2023 12  5 - 40 mg/dL Final    LDL/HDL Ratio 08/14/2023 1.03   Final    Glucose 08/14/2023 128 (H)  65 - 99 mg/dL Final    BUN 08/14/2023 24 (H)  8 - 23 mg/dL Final    Creatinine 08/14/2023 1.24 (H)  0.57 - 1.00 mg/dL Final    Sodium 08/14/2023 136  136 - 145 mmol/L Final    Potassium 08/14/2023 4.7  3.5 - 5.2 mmol/L Final    Chloride 08/14/2023 102  98 - 107 mmol/L Final    CO2 08/14/2023 23.3  22.0 - 29.0 mmol/L Final    Calcium 08/14/2023 9.2  8.6 - 10.5 mg/dL Final    BUN/Creatinine Ratio 08/14/2023 19.4  7.0 - 25.0 Final    Anion Gap 08/14/2023 10.7  5.0 - 15.0 mmol/L Final    eGFR 08/14/2023 44.1 (L)  >60.0 mL/min/1.73 Final    QT Interval 08/14/2023 444  ms Final    QTC Interval 08/14/2023 439  ms Final    Activated Clotting Time  08/14/2023 251 (H)  82 - 152 Seconds Final    Serial Number: [id number]:  [id number]    LVIDd 08/14/2023 4.0  cm Final    LVIDs 08/14/2023 2.9  cm Final    IVSd 08/14/2023 1.29  cm Final    LVPWd 08/14/2023 1.09  cm Final    FS 08/14/2023 25.8  % Final    IVS/LVPW 08/14/2023 1.18  cm Final    ESV(cubed) 08/14/2023 25.2  ml Final    LV Sys Vol (BSA corrected) 08/14/2023 26.3  cm2 Final    EDV(cubed) 08/14/2023 61.6  ml Final    LV Esparza Vol (BSA corrected) 08/14/2023 62.0  cm2 Final    LVOT area 08/14/2023 3.8  cm2 Final    LV mass(C)d 08/14/2023 160.3  grams Final    LVOT diam 08/14/2023 2.20  cm Final    EDV(MOD-sp4) 08/14/2023 107.0  ml Final    ESV(MOD-sp4) 08/14/2023 45.4  ml Final    SV(MOD-sp4) 08/14/2023 61.6  ml Final    SI(MOD-sp4) 08/14/2023 35.7  ml/m2 Final    EF(MOD-sp4) 08/14/2023 57.6  % Final    MV E max cristian 08/14/2023 88.1  cm/sec Final    MV A max cristian 08/14/2023 120.0  cm/sec Final    MV E/A 08/14/2023 0.73   Final    LA ESV Index (BP) 08/14/2023 18.7  ml/m2 Final    Med Peak E' Cristian 08/14/2023 4.0  cm/sec Final     Lat Peak E' Cristian 08/14/2023 5.9  cm/sec Final    Avg E/e' ratio 08/14/2023 17.80   Final    SV(LVOT) 08/14/2023 140.3  ml Final    TAPSE (>1.6) 08/14/2023 1.71  cm Final    LA dimension (2D)  08/14/2023 4.0  cm Final    LV V1 max 08/14/2023 143.0  cm/sec Final    LV V1 max PG 08/14/2023 8.2  mmHg Final    LV V1 mean PG 08/14/2023 4.0  mmHg Final    LV V1 VTI 08/14/2023 36.9  cm Final    Ao pk cristian 08/14/2023 240.0  cm/sec Final    Ao max PG 08/14/2023 23.0  mmHg Final    Ao mean PG 08/14/2023 12.0  mmHg Final    Ao V2 VTI 08/14/2023 60.7  cm Final    MERLIN(I,D) 08/14/2023 2.31  cm2 Final    AI P1/2t 08/14/2023 507.9  msec Final    TR max cristian 08/14/2023 231.0  cm/sec Final    TR max PG 08/14/2023 21.3  mmHg Final    RVSP(TR) 08/14/2023 31.3  mmHg Final    RAP systole 08/14/2023 10.0  mmHg Final    PA acc time 08/14/2023 0.10  sec Final    Ao root diam 08/14/2023 3.0  cm Final    ACS 08/14/2023 1.40  cm Final    EF(MOD-bp) 08/14/2023 57.0  % Final    QT Interval 08/14/2023 420  ms Final    QTC Interval 08/14/2023 436  ms Final    WBC 08/14/2023 12.52 (H)  3.40 - 10.80 10*3/mm3 Final    RBC 08/14/2023 4.34  3.77 - 5.28 10*6/mm3 Final    Hemoglobin 08/14/2023 12.9  12.0 - 15.9 g/dL Final    Hematocrit 08/14/2023 37.4  34.0 - 46.6 % Final    MCV 08/14/2023 86.2  79.0 - 97.0 fL Final    MCH 08/14/2023 29.7  26.6 - 33.0 pg Final    MCHC 08/14/2023 34.5  31.5 - 35.7 g/dL Final    RDW 08/14/2023 12.7  12.3 - 15.4 % Final    RDW-SD 08/14/2023 39.8  37.0 - 54.0 fl Final    MPV 08/14/2023 11.4  6.0 - 12.0 fL Final    Platelets 08/14/2023 174  140 - 450 10*3/mm3 Final    Hemoglobin A1C 08/14/2023 5.50  4.80 - 5.60 % Final    HS Troponin T 08/14/2023 343 (C)  <10 ng/L Final    HS Troponin T 08/14/2023 386 (C)  <10 ng/L Final    Troponin T Delta 08/14/2023 43 (C)  >=-4 - <+4 ng/L Final    QT Interval 08/14/2023 420  ms Final    QTC Interval 08/14/2023 412  ms Final    Glucose 08/14/2023 70  70 - 130 mg/dL Final    Glucose 08/14/2023  214 (H)  70 - 130 mg/dL Final    Glucose 08/14/2023 81  70 - 130 mg/dL Final    Glucose 08/14/2023 109  70 - 130 mg/dL Final    HS Troponin T 08/15/2023 180 (C)  <10 ng/L Final    QT Interval 08/15/2023 474  ms Final    QTC Interval 08/15/2023 461  ms Final    Glucose 08/15/2023 91  70 - 130 mg/dL Final    Glucose 08/15/2023 103  70 - 130 mg/dL Final         IMAGING:   FL Video Swallow Single Contrast    Result Date: 8/14/2023      Please see the speech pathologist's report for additional information.  This report was finalized on 8/14/2023 1:57 PM by Dr. Brandon Villanueva MD.      No results found for this or any previous visit.     No results found for this or any previous visit.          EXAM:  Constitutional:       General: Awake.      Appearance: Healthy appearance. Not in distress.   Eyes:      Conjunctiva/sclera: Conjunctivae normal.   HENT:      Head: Normocephalic and atraumatic.      Nose: Nose normal.    Mouth/Throat:      Pharynx: Oropharynx is clear.   Neck:      Thyroid: Thyroid normal.      Vascular: No carotid bruit or JVD.   Pulmonary:      Effort: Pulmonary effort is normal.      Breath sounds: Normal breath sounds.   Chest:      Chest wall: Not tender to palpatation.   Cardiovascular:      PMI at left midclavicular line. Normal rate. Regular rhythm. Normal S1 with normal intensity. Normal S2 with normal intensity.       Murmurs: There is no murmur.      No gallop.  No rub.   Pulses:     Intact distal pulses.   Edema:     Peripheral edema absent.   Abdominal:      General: Bowel sounds are normal. There is no distension.      Palpations: Abdomen is soft. There is no hepatomegaly.      Tenderness: There is no abdominal tenderness.   Musculoskeletal: Normal range of motion.      Cervical back: Normal range of motion. Skin:     General: Skin is warm and dry. There is no cyanosis.      Coloration: Skin is not jaundiced.   Neurological:      Mental Status: Alert and oriented to person, place and time.       Motor: Motor function is intact.      Gait: Gait is intact.   Psychiatric:         Attention and Perception: Attention and perception normal.         Speech: Speech normal.         Behavior: Behavior is cooperative.         Cognition and Memory: Cognition and memory normal.         Judgment: Judgment normal.        Procedure     ECG 12 Lead    Date/Time: 8/29/2023 3:37 PM  Performed by: Phu Ramírez MD  Authorized by: Phu Ramírez MD   Comparison: compared with previous ECG from 8/14/2023  Similar to previous ECG  Comments: This rhythm at 73, left axis, normal intervals, ST-T changes noted in the anterior leads.           Assessment & Plan     Diagnoses and all orders for this visit:    1. Coronary artery disease involving native coronary artery of native heart without angina pectoris (Primary)  -     ECG 12 Lead  -     Basic Metabolic Panel; Future  -     Magnesium; Future    Other orders  -     busPIRone (BUSPAR) 5 MG tablet; Take 2 tablets by mouth Daily.  Dispense: 30 tablet; Refill: 12          PLAN  Cardiac.  Patient s/p PCI to the right coronary artery and circumflex on August 14.  Dual antiplatelet therapy to continue.  Patient will be kept on lipid-lowering medications.  Patient medication will be adjusted due to her GI symptoms.  We will get her off Lasix.  We will stop her Ranexa.  Was advised to take meclizine as needed only.  Advised to not overdo things and will also advised to hydrate herself.           MEDS ORDERED DURING VISIT:    Medications Discontinued During This Encounter   Medication Reason    busPIRone (BUSPAR) 10 MG tablet *Therapy completed    furosemide (LASIX) 20 MG tablet *Therapy completed    ranolazine (RANEXA) 500 MG 12 hr tablet *Therapy completed    PARoxetine (PAXIL) 10 MG tablet Historical Med - Therapy completed        New Medications Ordered This Visit   Medications    busPIRone (BUSPAR) 5 MG tablet     Sig: Take 2 tablets by mouth Daily.     Dispense:  30 tablet     Refill:   12         Follow Up:   Return in about 2 months (around 10/29/2023) for Recheck.    Patient was given instructions and counseling regarding her condition or for health maintenance advice. Please see specific information pulled into the AVS if appropriate.   As always, Joseph Vann MD  I appreciate very much the opportunity to participate in the cardiovascular care of your patients. Please do not hesitate to call me with any questions with regards to Amanda Cruz evaluation and management.         This document has been electronically signed by Phu Ramírez MD [facility], Interventional Cardiology  August 29, 2023 15:40 EDT    Dictated Utilizing Dragon Dictation: Part of this note may be an electronic transcription/translation of spoken language to printed text using the Dragon Dictation System.

## 2023-08-31 ENCOUNTER — TELEPHONE (OUTPATIENT)
Dept: CARDIOLOGY | Facility: CLINIC | Age: 83
End: 2023-08-31
Payer: MEDICARE

## 2023-08-31 NOTE — TELEPHONE ENCOUNTER
Patient returned call , gave patient results       ----- Message from Phu Ramírez MD sent at 8/30/2023  8:40 AM EDT -----  Please call the patient regarding her lab result. They are normal. Thanks

## 2023-09-06 ENCOUNTER — PATIENT ROUNDING (BHMG ONLY) (OUTPATIENT)
Dept: CARDIOLOGY | Facility: CLINIC | Age: 83
End: 2023-09-06
Payer: MEDICARE

## 2023-09-06 NOTE — PROGRESS NOTES
September 6, 2023    Hello, may I speak with Amanda Cruz?    My name is Teresa      [facility]  [facility] CARD[facility]OLOG[facility]  [address]  [place] [address] [phone].    Before we get started may I verify your date of birth? 1940    I am calling to officially welcome you to our practice and ask about your recent visit. Is this a good time to talk? yes    Tell me about your visit with us. What things went well?  went great, Dr. Ramírez is very kind and thorough.       We're always looking for ways to make our patients' experiences even better. Do you have recommendations on ways we may improve?  no    Overall were you satisfied with your first visit to our practice? yes       I appreciate you taking the time to speak with me today. Is there anything else I can do for you? no      Thank you, and have a great day.

## 2023-12-05 ENCOUNTER — OFFICE VISIT (OUTPATIENT)
Dept: CARDIOLOGY | Facility: CLINIC | Age: 83
End: 2023-12-05
Payer: MEDICARE

## 2023-12-05 VITALS
HEART RATE: 51 BPM | OXYGEN SATURATION: 96 % | WEIGHT: 137.8 LBS | DIASTOLIC BLOOD PRESSURE: 66 MMHG | HEIGHT: 64 IN | BODY MASS INDEX: 23.52 KG/M2 | SYSTOLIC BLOOD PRESSURE: 148 MMHG

## 2023-12-05 DIAGNOSIS — E78.2 MIXED HYPERLIPIDEMIA: ICD-10-CM

## 2023-12-05 DIAGNOSIS — I25.10 CORONARY ARTERY DISEASE INVOLVING NATIVE HEART WITHOUT ANGINA PECTORIS, UNSPECIFIED VESSEL OR LESION TYPE: Primary | ICD-10-CM

## 2023-12-05 DIAGNOSIS — I15.0 RENOVASCULAR HYPERTENSION: ICD-10-CM

## 2023-12-05 PROCEDURE — 99212 OFFICE O/P EST SF 10 MIN: CPT | Performed by: INTERNAL MEDICINE

## 2023-12-05 PROCEDURE — 1159F MED LIST DOCD IN RCRD: CPT | Performed by: INTERNAL MEDICINE

## 2023-12-05 PROCEDURE — 1160F RVW MEDS BY RX/DR IN RCRD: CPT | Performed by: INTERNAL MEDICINE

## 2023-12-05 RX ORDER — PAROXETINE 10 MG/1
10 TABLET, FILM COATED ORAL EVERY MORNING
COMMUNITY

## 2023-12-05 RX ORDER — FUROSEMIDE 20 MG/1
20 TABLET ORAL DAILY
Qty: 30 TABLET | Refills: 12 | Status: SHIPPED | OUTPATIENT
Start: 2023-12-05

## 2023-12-05 RX ORDER — SULINDAC 200 MG/1
200 TABLET ORAL 2 TIMES DAILY
COMMUNITY

## 2023-12-05 RX ORDER — ATORVASTATIN CALCIUM 80 MG/1
80 TABLET, FILM COATED ORAL NIGHTLY
Qty: 30 TABLET | Refills: 12 | Status: SHIPPED | OUTPATIENT
Start: 2023-12-05

## 2023-12-05 RX ORDER — AMLODIPINE BESYLATE 5 MG/1
5 TABLET ORAL DAILY
Qty: 30 TABLET | Refills: 12 | Status: SHIPPED | OUTPATIENT
Start: 2023-12-05

## 2023-12-05 RX ORDER — FUROSEMIDE 20 MG/1
20 TABLET ORAL 2 TIMES DAILY
COMMUNITY
End: 2023-12-05 | Stop reason: SDUPTHER

## 2023-12-05 RX ORDER — PRASUGREL 10 MG/1
10 TABLET, FILM COATED ORAL DAILY
Qty: 30 TABLET | Refills: 12 | Status: SHIPPED | OUTPATIENT
Start: 2023-12-05

## 2023-12-05 RX ORDER — ASPIRIN 81 MG/1
81 TABLET ORAL DAILY
Qty: 30 TABLET | Refills: 12 | Status: SHIPPED | OUTPATIENT
Start: 2023-12-05

## 2023-12-05 RX ORDER — METOPROLOL SUCCINATE 25 MG/1
25 TABLET, EXTENDED RELEASE ORAL DAILY
Qty: 30 TABLET | Refills: 12 | Status: SHIPPED | OUTPATIENT
Start: 2023-12-05

## 2023-12-05 RX ORDER — LOSARTAN POTASSIUM 100 MG/1
100 TABLET ORAL DAILY
Qty: 30 TABLET | Refills: 12 | Status: SHIPPED | OUTPATIENT
Start: 2023-12-05

## 2023-12-05 NOTE — PROGRESS NOTES
Office Note    Subjective     Amanda Cruz is a 82 y.o. female who presents to day for follow-up      Active Problems:  Problem List Items Addressed This Visit          Cardiac and Vasculature    Coronary artery disease involving native heart without angina pectoris - Primary    Relevant Medications    metoprolol succinate XL (TOPROL-XL) 25 MG 24 hr tablet    amLODIPine (NORVASC) 5 MG tablet    prasugrel (EFFIENT) 10 MG tablet    Renovascular hypertension    Relevant Medications    metoprolol succinate XL (TOPROL-XL) 25 MG 24 hr tablet    losartan (Cozaar) 100 MG tablet    amLODIPine (NORVASC) 5 MG tablet    furosemide (LASIX) 20 MG tablet    Mixed hyperlipidemia    Relevant Medications    atorvastatin (LIPITOR) 80 MG tablet       HPI  Patient is a pleasant 82-year-old female past medical history significant for coronary artery disease s/p PTCA and stent placement of the circumflex and right coronary artery on August 14, 2023.  Patient has some shortness of breath on mild exertion.  Has had some occasional palpitations.  She has fatigue, lack of energy.  Mild lightheadedness and dizziness.  Patient blood pressure has been running high.  Denies any nausea vomiting.    PRIOR MEDS  Current Outpatient Medications on File Prior to Visit   Medication Sig Dispense Refill    busPIRone (BUSPAR) 5 MG tablet Take 2 tablets by mouth Daily. 30 tablet 12    glipiZIDE (GLUCOTROL) 5 MG tablet Take 1 tablet by mouth Daily.      levothyroxine (SYNTHROID, LEVOTHROID) 75 MCG tablet Take 1 tablet by mouth Daily.      meclizine (ANTIVERT) 25 MG tablet Take 1 tablet by mouth 3 (Three) Times a Day As Needed for Dizziness.      oxybutynin (DITROPAN) 5 MG tablet Take 1 tablet by mouth Daily.      PARoxetine (PAXIL) 10 MG tablet Take 1 tablet by mouth Every Morning.      sulindac (CLINORIL) 200 MG tablet Take 1 tablet by mouth 2 (Two) Times a Day.      traZODone (DESYREL) 50 MG tablet Take 1 tablet by mouth Every Night.       triamcinolone (KENALOG) 0.1 % cream Apply 1 application  topically to the appropriate area as directed 2 (Two) Times a Day As Needed for Irritation (applies to wrist).      [DISCONTINUED] aspirin 81 MG EC tablet Take 1 tablet by mouth Daily.      [DISCONTINUED] atorvastatin (LIPITOR) 80 MG tablet Take 1 tablet by mouth Every Night.      [DISCONTINUED] furosemide (LASIX) 20 MG tablet Take 1 tablet by mouth 2 (Two) Times a Day.      [DISCONTINUED] losartan (COZAAR) 25 MG tablet Take 1 tablet by mouth Daily. (Patient taking differently: Take 4 tablets by mouth Daily.) 30 tablet 12    [DISCONTINUED] metoprolol succinate XL (TOPROL-XL) 50 MG 24 hr tablet Take 1 tablet by mouth Daily.      [DISCONTINUED] ticagrelor (BRILINTA) 90 MG tablet tablet Take 1 tablet by mouth 2 (Two) Times a Day. 60 tablet 12    [DISCONTINUED] Constulose 10 GM/15ML solution TAKE 15ML BY MOUTH TWICE DAILY (Patient not taking: Reported on 12/5/2023)       No current facility-administered medications on file prior to visit.       ALLERGIES  Ancef [cefazolin] and Rocephin [ceftriaxone]    HISTORY  Past Medical History:   Diagnosis Date    Arthritis     Coronary artery disease involving native heart without angina pectoris 12/5/2023    Diabetes mellitus     Disease of thyroid gland     Elevated cholesterol     GERD (gastroesophageal reflux disease)     Hyperlipidemia     Hypertension     Smell and taste disorder        Social History     Socioeconomic History    Marital status:    Tobacco Use    Smoking status: Never    Smokeless tobacco: Never   Vaping Use    Vaping Use: Never used   Substance and Sexual Activity    Alcohol use: No    Drug use: No    Sexual activity: Defer       Family History   Problem Relation Age of Onset    Cancer Mother     Alzheimer's disease Mother     Uterine cancer Mother     Uterine cancer Maternal Grandmother        Review of Systems   Respiratory:  Positive for shortness of breath. Negative for cough and chest  "tightness.    Cardiovascular:  Positive for palpitations. Negative for chest pain and leg swelling.   Neurological:  Positive for light-headedness. Negative for syncope and headaches.       Objective     VITALS: /66 (BP Location: Left arm, Patient Position: Sitting, Cuff Size: Adult)   Pulse 51   Ht 162.6 cm (64\")   Wt 62.5 kg (137 lb 12.8 oz)   SpO2 96%   BMI 23.65 kg/m²     LABS:   No visits with results within 3 Month(s) from this visit.   Latest known visit with results is:   Lab on 08/29/2023   Component Date Value Ref Range Status    Glucose 08/29/2023 98  65 - 99 mg/dL Final    BUN 08/29/2023 37 (H)  8 - 23 mg/dL Final    Creatinine 08/29/2023 1.52 (H)  0.57 - 1.00 mg/dL Final    Sodium 08/29/2023 137  136 - 145 mmol/L Final    Potassium 08/29/2023 4.1  3.5 - 5.2 mmol/L Final    Chloride 08/29/2023 100  98 - 107 mmol/L Final    CO2 08/29/2023 25.1  22.0 - 29.0 mmol/L Final    Calcium 08/29/2023 9.6  8.6 - 10.5 mg/dL Final    BUN/Creatinine Ratio 08/29/2023 24.3  7.0 - 25.0 Final    Anion Gap 08/29/2023 11.9  5.0 - 15.0 mmol/L Final    eGFR 08/29/2023 34.5 (L)  >60.0 mL/min/1.73 Final    Magnesium 08/29/2023 2.3  1.6 - 2.4 mg/dL Final         IMAGING:   FL Video Swallow Single Contrast    Result Date: 8/14/2023      Please see the speech pathologist's report for additional information.  This report was finalized on 8/14/2023 1:57 PM by Dr. Brandon Villanueva MD.      No results found for this or any previous visit.     No results found for this or any previous visit.          EXAM:  Constitutional:       General: Awake.      Appearance: Healthy appearance. Not in distress.   Eyes:      Conjunctiva/sclera: Conjunctivae normal.   HENT:      Head: Normocephalic and atraumatic.      Nose: Nose normal.    Mouth/Throat:      Pharynx: Oropharynx is clear.   Neck:      Thyroid: Thyroid normal.      Vascular: No carotid bruit or JVD.   Pulmonary:      Effort: Pulmonary effort is normal.      Breath sounds: Normal " breath sounds.   Chest:      Chest wall: Not tender to palpatation.   Cardiovascular:      PMI at left midclavicular line. Normal rate. Regular rhythm. Normal S1 with normal intensity. Normal S2 with normal intensity.       Murmurs: There is no murmur.      No gallop.  No rub.   Pulses:     Intact distal pulses.   Edema:     Peripheral edema absent.   Abdominal:      General: Bowel sounds are normal. There is no distension.      Palpations: Abdomen is soft. There is no hepatomegaly.      Tenderness: There is no abdominal tenderness.   Musculoskeletal: Normal range of motion.      Cervical back: Normal range of motion. Skin:     General: Skin is warm and dry. There is no cyanosis.      Coloration: Skin is not jaundiced.   Neurological:      Mental Status: Alert and oriented to person, place and time.      Motor: Motor function is intact.      Gait: Gait is intact.   Psychiatric:         Attention and Perception: Attention and perception normal.         Speech: Speech normal.         Behavior: Behavior is cooperative.         Cognition and Memory: Cognition and memory normal.         Judgment: Judgment normal.          Procedure   Procedures       Assessment & Plan     Diagnoses and all orders for this visit:    1. Coronary artery disease involving native heart without angina pectoris, unspecified vessel or lesion type (Primary)    2. Renovascular hypertension    3. Mixed hyperlipidemia    Other orders  -     metoprolol succinate XL (TOPROL-XL) 25 MG 24 hr tablet; Take 1 tablet by mouth Daily.  Dispense: 30 tablet; Refill: 12  -     losartan (Cozaar) 100 MG tablet; Take 1 tablet by mouth Daily.  Dispense: 30 tablet; Refill: 12  -     amLODIPine (NORVASC) 5 MG tablet; Take 1 tablet by mouth Daily.  Dispense: 30 tablet; Refill: 12  -     atorvastatin (LIPITOR) 80 MG tablet; Take 1 tablet by mouth Every Night.  Dispense: 30 tablet; Refill: 12  -     aspirin 81 MG EC tablet; Take 1 tablet by mouth Daily.  Dispense: 30  tablet; Refill: 12  -     furosemide (LASIX) 20 MG tablet; Take 1 tablet by mouth Daily.  Dispense: 30 tablet; Refill: 12  -     prasugrel (EFFIENT) 10 MG tablet; Take 1 tablet by mouth Daily.  Dispense: 30 tablet; Refill: 12          PLAN  #1 cardiac.  Patient with history of coronary disease with stents into the circumflex and right coronary artery on August 14, 2023.  She is on Brilinta and aspirin.  Will stop Brilinta and start Effient instead due to shortness of breath issues.  Will continue dual antiplatelet therapy till August 2024 and then changed to aspirin after that  #2 hypertension.  Patient blood pressure is running high.  Will cut back on metoprolol due to tiredness and start on amlodipine daily.  Systolic pressure less than 139 will be advised.  Patient does not have blood pressure machine at home.  Was advised to check pressure at home in a week's time at a local pharmacy and then call us back with the numbers.  #3 hyperlipidemia.  Will continue cholesterol-lowering medications.  #4 patient is on Lasix 20 mg twice daily.  Will cut back on to once daily.  May consider changing to as needed in future.           MEDS ORDERED DURING VISIT:    Medications Discontinued During This Encounter   Medication Reason    Constulose 10 GM/15ML solution *Therapy completed    metoprolol succinate XL (TOPROL-XL) 50 MG 24 hr tablet     losartan (COZAAR) 25 MG tablet *Therapy completed    ticagrelor (BRILINTA) 90 MG tablet tablet *Therapy completed    aspirin 81 MG EC tablet Reorder    atorvastatin (LIPITOR) 80 MG tablet Reorder    furosemide (LASIX) 20 MG tablet Reorder        New Medications Ordered This Visit   Medications    metoprolol succinate XL (TOPROL-XL) 25 MG 24 hr tablet     Sig: Take 1 tablet by mouth Daily.     Dispense:  30 tablet     Refill:  12    losartan (Cozaar) 100 MG tablet     Sig: Take 1 tablet by mouth Daily.     Dispense:  30 tablet     Refill:  12    amLODIPine (NORVASC) 5 MG tablet     Sig:  Take 1 tablet by mouth Daily.     Dispense:  30 tablet     Refill:  12    atorvastatin (LIPITOR) 80 MG tablet     Sig: Take 1 tablet by mouth Every Night.     Dispense:  30 tablet     Refill:  12    aspirin 81 MG EC tablet     Sig: Take 1 tablet by mouth Daily.     Dispense:  30 tablet     Refill:  12    furosemide (LASIX) 20 MG tablet     Sig: Take 1 tablet by mouth Daily.     Dispense:  30 tablet     Refill:  12    prasugrel (EFFIENT) 10 MG tablet     Sig: Take 1 tablet by mouth Daily.     Dispense:  30 tablet     Refill:  12         Follow Up:   Return in about 6 months (around 6/5/2024) for Recheck.    Patient was given instructions and counseling regarding her condition or for health maintenance advice. Please see specific information pulled into the AVS if appropriate.   As always, Joseph Vann MD  I appreciate very much the opportunity to participate in the cardiovascular care of your patients. Please do not hesitate to call me with any questions with regards to Amanda Cruz evaluation and management.         This document has been electronically signed by Phu Ramírez MD [facility], Interventional Cardiology  December 5, 2023 13:35 EST    Dictated Utilizing Dragon Dictation: Part of this note may be an electronic transcription/translation of spoken language to printed text using the Dragon Dictation System.

## 2023-12-12 ENCOUNTER — TELEPHONE (OUTPATIENT)
Dept: CARDIOLOGY | Facility: CLINIC | Age: 83
End: 2023-12-12

## 2023-12-12 NOTE — TELEPHONE ENCOUNTER
Patient called with BP readings, on 12/12/2023 it 152/62 hr-46,waited 5min. 131/60 HR-43. Approximately 12:30 on 12/12/2023.

## 2024-06-06 ENCOUNTER — OFFICE VISIT (OUTPATIENT)
Dept: CARDIOLOGY | Facility: CLINIC | Age: 84
End: 2024-06-06
Payer: MEDICARE

## 2024-06-06 VITALS
SYSTOLIC BLOOD PRESSURE: 144 MMHG | HEIGHT: 64 IN | OXYGEN SATURATION: 96 % | HEART RATE: 47 BPM | DIASTOLIC BLOOD PRESSURE: 73 MMHG | BODY MASS INDEX: 23.6 KG/M2 | WEIGHT: 138.2 LBS

## 2024-06-06 DIAGNOSIS — E78.2 MIXED HYPERLIPIDEMIA: Chronic | ICD-10-CM

## 2024-06-06 DIAGNOSIS — I15.0 RENOVASCULAR HYPERTENSION: Chronic | ICD-10-CM

## 2024-06-06 DIAGNOSIS — I25.10 CORONARY ARTERY DISEASE INVOLVING NATIVE CORONARY ARTERY OF NATIVE HEART WITHOUT ANGINA PECTORIS: Primary | ICD-10-CM

## 2024-06-06 PROCEDURE — 1160F RVW MEDS BY RX/DR IN RCRD: CPT | Performed by: NURSE PRACTITIONER

## 2024-06-06 PROCEDURE — 1159F MED LIST DOCD IN RCRD: CPT | Performed by: NURSE PRACTITIONER

## 2024-06-06 PROCEDURE — 99214 OFFICE O/P EST MOD 30 MIN: CPT | Performed by: NURSE PRACTITIONER

## 2024-06-06 PROCEDURE — 93000 ELECTROCARDIOGRAM COMPLETE: CPT | Performed by: NURSE PRACTITIONER

## 2024-06-06 RX ORDER — ATORVASTATIN CALCIUM 40 MG/1
40 TABLET, FILM COATED ORAL DAILY
Qty: 90 TABLET | Refills: 3 | Status: SHIPPED | OUTPATIENT
Start: 2024-06-06

## 2024-06-06 NOTE — PROGRESS NOTES
"Chief Complaint  Follow-up (Intermittent CP, dizziness and falling,chest pain and blurred vision,SOA,chronic fatigue and sleeps a lot.) and Med Management (Tolerating all current medications with no side effects.)    Subjective          Amanda Cruz presents to [facility] for follow up.    History of Present Illness    Amanda was last seen in clinic on 12/5/2023 by Dr. Ramírez.  She was having some shortness of breath issues and her Brilinta was stopped and switch to Effient.  Metoprolol was decreased due to patient's report of fatigue and amlodipine was started.  Lasix was decreased to 20 mg daily.    At today's visit Amanda is unsure of which medications she is actually taking.  She has brought a list from her pharmacy and although she tells me that she is taking metoprolol it has not been filled since November 2023.  So I have asked her to go home and clarify her medications.  Call us back with them.    She does report some intermittent sensation of chest pressure.  She states that it \"comes and goes\".  Symptoms usually do not last very long.  She reports \"tiredness in her chest.\"    She does report she had an episode of chest discomfort with blurred vision yesterday.  She reports fatigue.    Objective     Vital Signs:   /73 (BP Location: Left arm, Patient Position: Sitting, Cuff Size: Adult)   Pulse (!) 47   Ht 162.6 cm (64\")   Wt 62.7 kg (138 lb 3.2 oz)   SpO2 96%   BMI 23.72 kg/m²       Physical Exam  Vitals reviewed.   Constitutional:       Appearance: Normal appearance. She is well-developed.   Cardiovascular:      Rate and Rhythm: Normal rate and regular rhythm.      Heart sounds: Murmur heard.      No friction rub. No gallop.   Pulmonary:      Effort: Pulmonary effort is normal. No respiratory distress.      Breath sounds: Normal breath sounds. No wheezing or rales.   Skin:     General: Skin is warm and dry.   Neurological:      Mental Status: She is alert and " oriented to person, place, and time.   Psychiatric:         Mood and Affect: Mood normal.         Behavior: Behavior normal.          Result Review :            ECG 12 Lead    Date/Time: 6/6/2024 1:41 PM  Performed by: Eliana Cagle APRN    Authorized by: Eliana Cagle APRN  Comparison: compared with previous ECG from 8/29/2023  Similar to previous ECG  Comparison to previous ECG: Sinus rhythm 73 bpm, nonspecific ST/T wave abnormality  Rhythm: sinus bradycardia  Rate: bradycardic  BPM: 49  Q waves: V1, V2, V3, V4 and V5    Comments: QTc 387           Most recent echocardiogram  Results for orders placed during the hospital encounter of 08/14/23    Adult Transthoracic Echo Complete W/ Cont if Necessary Per Protocol    Interpretation Summary    Left ventricular systolic function is normal. Calculated left ventricular EF = 57% Left ventricular ejection fraction appears to be 56 - 60%.    Left ventricular diastolic function is consistent with (grade I) impaired relaxation.    Mild aortic valve stenosis is present.    Estimated right ventricular systolic pressure from tricuspid regurgitation is normal (<35 mmHg).      Most recent Stress Test       Most recent Cardiac Cath  Results for orders placed during the hospital encounter of 08/14/23    Cardiac Catheterization/Vascular Study    Conclusion    Dist Cx lesion is 99% stenosed.    Mid RCA lesion is 90% stenosed.    1.  Cardiac.  Patient with PTCA and stent placement of the distal circumflex and right coronary artery.  Dual antiplatelet therapy to continue.  Lipid-lowering medication will be advised.  Patient will need swallow evaluation as she is having some swallow issues also.      Current Outpatient Medications   Medication Sig Dispense Refill    amLODIPine (NORVASC) 5 MG tablet Take 1 tablet by mouth Daily. 30 tablet 12    aspirin 81 MG EC tablet Take 1 tablet by mouth Daily. 30 tablet 12    busPIRone (BUSPAR) 5 MG tablet Take 2 tablets by mouth Daily. 30  tablet 12    furosemide (LASIX) 20 MG tablet Take 1 tablet by mouth Daily. 30 tablet 12    glipiZIDE (GLUCOTROL) 5 MG tablet Take 1 tablet by mouth Daily.      levothyroxine (SYNTHROID, LEVOTHROID) 75 MCG tablet Take 1 tablet by mouth Daily.      oxybutynin (DITROPAN) 5 MG tablet Take 1 tablet by mouth Daily.      PARoxetine (PAXIL) 10 MG tablet Take 1 tablet by mouth Every Morning.      prasugrel (EFFIENT) 10 MG tablet Take 1 tablet by mouth Daily. 30 tablet 12    sulindac (CLINORIL) 200 MG tablet Take 1 tablet by mouth 2 (Two) Times a Day.      traZODone (DESYREL) 50 MG tablet Take 1 tablet by mouth Every Night.      triamcinolone (KENALOG) 0.1 % cream Apply 1 Application topically to the appropriate area as directed 2 (Two) Times a Day As Needed for Irritation (applies to wrist).      atorvastatin (LIPITOR) 40 MG tablet Take 1 tablet by mouth Daily. 90 tablet 3    losartan (Cozaar) 25 MG tablet Take 1 tablet by mouth Daily. 30 tablet 5     No current facility-administered medications for this visit.            Assessment and Plan    Problem List Items Addressed This Visit          Cardiac and Vasculature    Coronary artery disease involving native heart without angina pectoris - Primary (Chronic)    Relevant Orders    ECG 12 Lead    Renovascular hypertension (Chronic)    Relevant Medications    losartan (Cozaar) 25 MG tablet    Mixed hyperlipidemia (Chronic)    Relevant Medications    atorvastatin (LIPITOR) 40 MG tablet           Follow Up     Medications were reviewed with the patient.    Continue aspirin, Effient, and atorvastatin for ASCVD.    The patient did call back and states that she is not taking the metoprolol nor the losartan.  Since her baseline heart rate is low, I will start her on losartan.  Continue amlodipine for hypertension.    I decreased her atorvastatin to 40 mg daily today.  Patient is an elderly female with complaints of fatigue and aching.  This may be secondary to high-dose atorvastatin.   Her LDL is at goal.    Return in about 4 weeks (around 7/4/2024).    Patient was given instructions and counseling regarding her condition or for health maintenance advice. Please see specific information pulled into the AVS if appropriate.

## 2024-06-07 ENCOUNTER — TELEPHONE (OUTPATIENT)
Dept: CARDIOLOGY | Facility: CLINIC | Age: 84
End: 2024-06-07
Payer: MEDICARE

## 2024-06-07 NOTE — TELEPHONE ENCOUNTER
Patient is returning a call regarding Losartan and Metoprolol patient has not filled either medication since late 2023. She wants to know if you was going to re-prescribe?

## 2024-06-09 PROBLEM — I25.10 CORONARY ARTERY DISEASE INVOLVING NATIVE HEART WITHOUT ANGINA PECTORIS: Chronic | Status: ACTIVE | Noted: 2023-12-05

## 2024-06-09 PROBLEM — E78.2 MIXED HYPERLIPIDEMIA: Chronic | Status: ACTIVE | Noted: 2023-12-05

## 2024-06-09 PROBLEM — I15.0 RENOVASCULAR HYPERTENSION: Chronic | Status: ACTIVE | Noted: 2023-12-05

## 2024-06-09 RX ORDER — LOSARTAN POTASSIUM 25 MG/1
25 TABLET ORAL DAILY
Qty: 30 TABLET | Refills: 5 | Status: SHIPPED | OUTPATIENT
Start: 2024-06-09

## 2024-06-10 ENCOUNTER — TELEPHONE (OUTPATIENT)
Dept: CARDIOLOGY | Facility: CLINIC | Age: 84
End: 2024-06-10
Payer: MEDICARE

## 2024-06-10 RX ORDER — AMLODIPINE BESYLATE 10 MG/1
10 TABLET ORAL DAILY
Start: 2024-06-10

## 2024-06-10 NOTE — TELEPHONE ENCOUNTER
Patient called the office regarding her medications.  I explained to her that since she is not taking either losartan or metoprolol we will start over and try to control her blood pressure.  I have written a prescription for losartan 25 mg.  I have sent it to her pharmacy.  She was made aware of this.    I then went over all of her medications with her.  We will send her a list of medications for her to review further.

## 2024-07-18 ENCOUNTER — OFFICE VISIT (OUTPATIENT)
Dept: CARDIOLOGY | Facility: CLINIC | Age: 84
End: 2024-07-18
Payer: MEDICARE

## 2024-07-18 VITALS
HEART RATE: 51 BPM | WEIGHT: 137 LBS | SYSTOLIC BLOOD PRESSURE: 145 MMHG | OXYGEN SATURATION: 96 % | DIASTOLIC BLOOD PRESSURE: 55 MMHG | BODY MASS INDEX: 23.39 KG/M2 | HEIGHT: 64 IN

## 2024-07-18 DIAGNOSIS — I15.0 RENOVASCULAR HYPERTENSION: Primary | Chronic | ICD-10-CM

## 2024-07-18 DIAGNOSIS — I25.10 CORONARY ARTERY DISEASE INVOLVING NATIVE HEART WITHOUT ANGINA PECTORIS, UNSPECIFIED VESSEL OR LESION TYPE: Chronic | ICD-10-CM

## 2024-07-18 PROCEDURE — 1160F RVW MEDS BY RX/DR IN RCRD: CPT | Performed by: INTERNAL MEDICINE

## 2024-07-18 PROCEDURE — 99214 OFFICE O/P EST MOD 30 MIN: CPT | Performed by: INTERNAL MEDICINE

## 2024-07-18 PROCEDURE — 1159F MED LIST DOCD IN RCRD: CPT | Performed by: INTERNAL MEDICINE

## 2024-07-18 RX ORDER — FUROSEMIDE 20 MG/1
20 TABLET ORAL DAILY
Qty: 30 TABLET | Refills: 12 | Status: SHIPPED | OUTPATIENT
Start: 2024-07-18

## 2024-07-18 RX ORDER — ASPIRIN 81 MG/1
81 TABLET ORAL DAILY
Qty: 30 TABLET | Refills: 12 | Status: SHIPPED | OUTPATIENT
Start: 2024-07-18

## 2024-07-18 RX ORDER — MULTIPLE VITAMINS W/ MINERALS TAB 9MG-400MCG
1 TAB ORAL DAILY
COMMUNITY

## 2024-07-18 NOTE — PROGRESS NOTES
Office Note    Subjective     Amanda Cruz is a 83 y.o. female who presents to day for 1 month milan saldaña      Active Problems:  Problem List Items Addressed This Visit          Cardiac and Vasculature    Coronary artery disease involving native heart without angina pectoris (Chronic)    Renovascular hypertension - Primary (Chronic)    Relevant Medications    furosemide (LASIX) 20 MG tablet       HPI  Is a pleasant 83-year-old female past medical history significant for coronary disease s/p PTCA and stent placement to the circumflex and right coronary artery in August 2023.  Patient has been doing well.  Has had better blood pressures at home than in clinic today.  Has some mild dizziness at times fatigue and mild shortness of breath no blackouts no hurting in the chest.  No lower extremity edema.  She has cold intolerance.    PRIOR MEDS  Current Outpatient Medications on File Prior to Visit   Medication Sig Dispense Refill    amLODIPine (NORVASC) 10 MG tablet Take 1 tablet by mouth Daily.      atorvastatin (LIPITOR) 40 MG tablet Take 1 tablet by mouth Daily. (Patient taking differently: Take 2 tablets by mouth Daily.) 90 tablet 3    busPIRone (BUSPAR) 5 MG tablet Take 2 tablets by mouth Daily. 30 tablet 12    glipiZIDE (GLUCOTROL) 5 MG tablet Take 1 tablet by mouth Daily.      levothyroxine (SYNTHROID, LEVOTHROID) 75 MCG tablet Take 1 tablet by mouth Daily.      losartan (Cozaar) 25 MG tablet Take 1 tablet by mouth Daily. 30 tablet 5    multivitamin with minerals tablet tablet Take 1 tablet by mouth Daily.      oxybutynin (DITROPAN) 5 MG tablet Take 1 tablet by mouth Daily.      PARoxetine (PAXIL) 10 MG tablet Take 1 tablet by mouth Every Morning.      prasugrel (EFFIENT) 10 MG tablet Take 1 tablet by mouth Daily. 30 tablet 12    sulindac (CLINORIL) 200 MG tablet Take 1 tablet by mouth 2 (Two) Times a Day.      traZODone (DESYREL) 50 MG tablet Take 1 tablet by mouth Every Night.      triamcinolone (KENALOG) 0.1  "% cream Apply 1 Application topically to the appropriate area as directed 2 (Two) Times a Day As Needed for Irritation (applies to wrist).      [DISCONTINUED] aspirin 81 MG EC tablet Take 1 tablet by mouth Daily. 30 tablet 12    [DISCONTINUED] furosemide (LASIX) 20 MG tablet Take 1 tablet by mouth Daily. 30 tablet 12     No current facility-administered medications on file prior to visit.       ALLERGIES  Ancef [cefazolin] and Rocephin [ceftriaxone]    HISTORY  Past Medical History:   Diagnosis Date    Arthritis     Coronary artery disease involving native heart without angina pectoris 12/5/2023    Diabetes mellitus     Disease of thyroid gland     Elevated cholesterol     GERD (gastroesophageal reflux disease)     Hyperlipidemia     Hypertension     Smell and taste disorder        Social History     Socioeconomic History    Marital status:    Tobacco Use    Smoking status: Never    Smokeless tobacco: Never   Vaping Use    Vaping status: Never Used   Substance and Sexual Activity    Alcohol use: No    Drug use: No    Sexual activity: Defer       Family History   Problem Relation Age of Onset    Cancer Mother     Alzheimer's disease Mother     Uterine cancer Mother     Uterine cancer Maternal Grandmother        Review of Systems   Constitutional:  Positive for chills.   Respiratory:  Positive for shortness of breath. Negative for chest tightness.    Neurological:  Positive for dizziness. Negative for seizures and headaches.       Objective     VITALS: /55 (BP Location: Left arm, Patient Position: Sitting, Cuff Size: Adult)   Pulse 51   Ht 162.6 cm (64\")   Wt 62.1 kg (137 lb)   SpO2 96%   BMI 23.52 kg/m²     LABS:   No visits with results within 3 Month(s) from this visit.   Latest known visit with results is:   Lab on 08/29/2023   Component Date Value Ref Range Status    Glucose 08/29/2023 98  65 - 99 mg/dL Final    BUN 08/29/2023 37 (H)  8 - 23 mg/dL Final    Creatinine 08/29/2023 1.52 (H)  0.57 - " 1.00 mg/dL Final    Sodium 08/29/2023 137  136 - 145 mmol/L Final    Potassium 08/29/2023 4.1  3.5 - 5.2 mmol/L Final    Chloride 08/29/2023 100  98 - 107 mmol/L Final    CO2 08/29/2023 25.1  22.0 - 29.0 mmol/L Final    Calcium 08/29/2023 9.6  8.6 - 10.5 mg/dL Final    BUN/Creatinine Ratio 08/29/2023 24.3  7.0 - 25.0 Final    Anion Gap 08/29/2023 11.9  5.0 - 15.0 mmol/L Final    eGFR 08/29/2023 34.5 (L)  >60.0 mL/min/1.73 Final    Magnesium 08/29/2023 2.3  1.6 - 2.4 mg/dL Final         IMAGING:   No Images in the past 120 days found..  No results found for this or any previous visit.     No results found for this or any previous visit.          EXAM:  Constitutional:       General: Awake.      Appearance: Healthy appearance. Not in distress.   Eyes:      Conjunctiva/sclera: Conjunctivae normal.   HENT:      Head: Normocephalic and atraumatic.      Nose: Nose normal.    Mouth/Throat:      Pharynx: Oropharynx is clear.   Neck:      Thyroid: Thyroid normal.      Vascular: No carotid bruit or JVD.   Pulmonary:      Effort: Pulmonary effort is normal.      Breath sounds: Normal breath sounds.   Chest:      Chest wall: Not tender to palpatation.   Cardiovascular:      PMI at left midclavicular line. Normal rate. Regular rhythm. Normal S1 with normal intensity. Normal S2 with normal intensity.       Murmurs: There is no murmur.      No gallop.  No rub.   Pulses:     Intact distal pulses.   Edema:     Peripheral edema absent.   Abdominal:      General: Bowel sounds are normal. There is no distension.      Palpations: Abdomen is soft. There is no hepatomegaly.      Tenderness: There is no abdominal tenderness.   Musculoskeletal: Normal range of motion.      Cervical back: Normal range of motion. Skin:     General: Skin is warm and dry. There is no cyanosis.      Coloration: Skin is not jaundiced.   Neurological:      Mental Status: Alert and oriented to person, place and time.      Motor: Motor function is intact.      Gait:  Gait is intact.   Psychiatric:         Attention and Perception: Attention and perception normal.         Speech: Speech normal.         Behavior: Behavior is cooperative.         Cognition and Memory: Cognition and memory normal.         Judgment: Judgment normal.          Procedure   Procedures       Assessment & Plan     Diagnoses and all orders for this visit:    1. Renovascular hypertension (Primary)    2. Coronary artery disease involving native heart without angina pectoris, unspecified vessel or lesion type    Other orders  -     aspirin 81 MG EC tablet; Take 1 tablet by mouth Daily.  Dispense: 30 tablet; Refill: 12  -     furosemide (LASIX) 20 MG tablet; Take 1 tablet by mouth Daily.  Dispense: 30 tablet; Refill: 12          PLAN  #1 Cardiac patient with history of coronary disease with PTCA and stent placement to the circumflex and right coronary artery in August 2023.  She is on Effient and aspirin.  Will continue same  #2 hyperlipidemia.  Continue lipid-lowering medication  #3 hypothyroidism.  Patient has symptoms of fatigue.  May consider TSH level check.  May need to have her medications adjusted  #4 hypertension.  Patient blood pressure at home is usually better.  Repeat pressure today in clinic was 145/55.  Initial blood pressure was high.  Will continue current medications.  Systolic pressure less than 139 will be advised.         MEDS ORDERED DURING VISIT:    Medications Discontinued During This Encounter   Medication Reason    aspirin 81 MG EC tablet Reorder    furosemide (LASIX) 20 MG tablet Reorder        New Medications Ordered This Visit   Medications    aspirin 81 MG EC tablet     Sig: Take 1 tablet by mouth Daily.     Dispense:  30 tablet     Refill:  12    furosemide (LASIX) 20 MG tablet     Sig: Take 1 tablet by mouth Daily.     Dispense:  30 tablet     Refill:  12         Follow Up:   Return in about 6 months (around 1/18/2025) for Recheck.    Patient was given instructions and counseling  regarding her condition or for health maintenance advice. Please see specific information pulled into the AVS if appropriate.   As always, Joseph Vann MD  I appreciate very much the opportunity to participate in the cardiovascular care of your patients. Please do not hesitate to call me with any questions with regards to Amanda Cruz evaluation and management.         This document has been electronically signed by Phu Ramírez MD [facility], Interventional Cardiology  July 18, 2024 14:05 EDT    Dictated Utilizing Dragon Dictation: Part of this note may be an electronic transcription/translation of spoken language to printed text using the Dragon Dictation System.

## 2024-10-31 ENCOUNTER — TELEPHONE (OUTPATIENT)
Dept: CARDIOLOGY | Facility: CLINIC | Age: 84
End: 2024-10-31

## 2024-10-31 NOTE — TELEPHONE ENCOUNTER
Caller: Amanda Cruz    Relationship to patient: Self    Best call back number: [phone] (home)     Chief complaint: FATIGUE - LOW HEART RATE - DIZZINESS WHEN WALKING    Type of visit: FU    Requested date: PT WOULD LIKE TO COME IN NOVEMBER    If rescheduling, when is the original appointment: 1.14.25    Additional notes: PLEASE CALL PT WHEN AVAILABLE TO R/S

## 2024-10-31 NOTE — TELEPHONE ENCOUNTER
"Patient called in and states having dizziness, weakness, fatigue and low heart rate. Patient had appointment for January 2025 which she requested to be seen sooner. First available was 12/2/24. Patient was advised to see PCP or go to ED if she has any more \" episodes\" . Patient voiced understanding   "

## 2024-11-04 ENCOUNTER — TELEPHONE (OUTPATIENT)
Dept: CARDIOLOGY | Facility: CLINIC | Age: 84
End: 2024-11-04
Payer: MEDICARE

## 2024-11-04 NOTE — TELEPHONE ENCOUNTER
Left  for patient to call us back. Dr Ramírez requests she come in for a two week event monitor prior to her appointment in December.

## 2024-12-02 ENCOUNTER — OFFICE VISIT (OUTPATIENT)
Dept: CARDIOLOGY | Facility: CLINIC | Age: 84
End: 2024-12-02
Payer: MEDICARE

## 2024-12-02 VITALS
OXYGEN SATURATION: 98 % | SYSTOLIC BLOOD PRESSURE: 163 MMHG | BODY MASS INDEX: 23.9 KG/M2 | HEART RATE: 59 BPM | HEIGHT: 64 IN | DIASTOLIC BLOOD PRESSURE: 81 MMHG | WEIGHT: 140 LBS

## 2024-12-02 DIAGNOSIS — I25.10 CORONARY ARTERY DISEASE INVOLVING NATIVE CORONARY ARTERY OF NATIVE HEART WITHOUT ANGINA PECTORIS: Chronic | ICD-10-CM

## 2024-12-02 DIAGNOSIS — I15.0 RENOVASCULAR HYPERTENSION: Primary | Chronic | ICD-10-CM

## 2024-12-02 DIAGNOSIS — R55 SYNCOPE AND COLLAPSE: ICD-10-CM

## 2024-12-02 DIAGNOSIS — I24.81 ACUTE CORONARY MICROVASCULAR DYSFUNCTION: ICD-10-CM

## 2024-12-02 DIAGNOSIS — G47.19 EXCESSIVE DAYTIME SLEEPINESS: ICD-10-CM

## 2024-12-02 DIAGNOSIS — E78.2 MIXED HYPERLIPIDEMIA: Chronic | ICD-10-CM

## 2024-12-02 PROCEDURE — 99214 OFFICE O/P EST MOD 30 MIN: CPT | Performed by: INTERNAL MEDICINE

## 2024-12-02 RX ORDER — LOSARTAN POTASSIUM 50 MG/1
50 TABLET ORAL DAILY
Qty: 30 TABLET | Refills: 12 | Status: SHIPPED | OUTPATIENT
Start: 2024-12-02

## 2024-12-02 NOTE — PROGRESS NOTES
Office Note    Subjective     Amanda Cruz is a 83 y.o. female who presents to day for follow-up      Active Problems:  Problem List Items Addressed This Visit          Cardiac and Vasculature    Coronary artery disease involving native heart without angina pectoris (Chronic)    Renovascular hypertension - Primary (Chronic)    Relevant Medications    losartan (Cozaar) 50 MG tablet    Other Relevant Orders    Stress Test With Myocardial Perfusion One Day    Cardiac Event Monitor (FERNIE) or Mobile Cardiac Outpatient Telemetry (MCT)    Adult Transthoracic Echo Complete W/ Cont if Necessary Per Protocol    Mixed hyperlipidemia (Chronic)    Relevant Orders    Stress Test With Myocardial Perfusion One Day    Cardiac Event Monitor (FERNIE) or Mobile Cardiac Outpatient Telemetry (MCT)    Adult Transthoracic Echo Complete W/ Cont if Necessary Per Protocol       Sleep    Excessive daytime sleepiness    Relevant Orders    Ambulatory Referral to Sleep Medicine       Symptoms and Signs    Syncope and collapse    Relevant Orders    Cardiac Event Monitor (FERNIE) or Mobile Cardiac Outpatient Telemetry (MCT)       Other    Acute coronary microvascular dysfunction    Relevant Orders    Stress Test With Myocardial Perfusion One Day       HPI  Patient is a pleasant 83-year-old female past medical history significant for coronary disease status post PTCA stent placement of the right coronary artery and circumflex artery in August 2023.  Patient has been having high blood pressures she has been having dizziness lightheadedness excessive daytime sleepiness.  Heart rate stays in 40s to low 50s.  She has had dizziness lightheadedness and presyncope also few weeks back.  She been having some chest pressure symptoms also.  On review of her blood pressure record from home her pressure has been running in 140s to 170s range    PRIOR MEDS  Current Outpatient Medications on File Prior to Visit   Medication Sig Dispense Refill    amLODIPine  (NORVASC) 10 MG tablet Take 1 tablet by mouth Daily.      atorvastatin (LIPITOR) 40 MG tablet Take 1 tablet by mouth Daily. (Patient taking differently: Take 2 tablets by mouth Daily.) 90 tablet 3    busPIRone (BUSPAR) 5 MG tablet Take 2 tablets by mouth Daily. 30 tablet 12    glipiZIDE (GLUCOTROL) 5 MG tablet Take 1 tablet by mouth Daily.      levothyroxine (SYNTHROID, LEVOTHROID) 75 MCG tablet Take 1 tablet by mouth Daily.      multivitamin with minerals tablet tablet Take 1 tablet by mouth Daily.      oxybutynin (DITROPAN) 5 MG tablet Take 1 tablet by mouth Daily.      PARoxetine (PAXIL) 10 MG tablet Take 1 tablet by mouth Every Morning.      prasugrel (EFFIENT) 10 MG tablet Take 1 tablet by mouth Daily. 30 tablet 12    sulindac (CLINORIL) 200 MG tablet Take 1 tablet by mouth 2 (Two) Times a Day.      traZODone (DESYREL) 50 MG tablet Take 1 tablet by mouth Every Night.      triamcinolone (KENALOG) 0.1 % cream Apply 1 Application topically to the appropriate area as directed 2 (Two) Times a Day As Needed for Irritation (applies to wrist).      [DISCONTINUED] losartan (Cozaar) 25 MG tablet Take 1 tablet by mouth Daily. 30 tablet 5    [DISCONTINUED] aspirin 81 MG EC tablet Take 1 tablet by mouth Daily. (Patient not taking: Reported on 12/2/2024) 30 tablet 12    [DISCONTINUED] furosemide (LASIX) 20 MG tablet Take 1 tablet by mouth Daily. (Patient not taking: Reported on 12/2/2024) 30 tablet 12     No current facility-administered medications on file prior to visit.       ALLERGIES  Ancef [cefazolin] and Rocephin [ceftriaxone]    HISTORY  Past Medical History:   Diagnosis Date    Arthritis     Coronary artery disease involving native heart without angina pectoris 12/5/2023    Diabetes mellitus     Disease of thyroid gland     Elevated cholesterol     GERD (gastroesophageal reflux disease)     Hyperlipidemia     Hypertension     Smell and taste disorder        Social History     Socioeconomic History    Marital status:  "   Tobacco Use    Smoking status: Never    Smokeless tobacco: Never   Vaping Use    Vaping status: Never Used   Substance and Sexual Activity    Alcohol use: No    Drug use: No    Sexual activity: Defer       Family History   Problem Relation Age of Onset    Cancer Mother     Alzheimer's disease Mother     Uterine cancer Mother     Uterine cancer Maternal Grandmother        Review of Systems   Constitutional:  Positive for fatigue.   Respiratory:  Positive for chest tightness and shortness of breath.    Cardiovascular:  Positive for chest pain and palpitations.   Neurological:  Positive for dizziness and light-headedness.       Objective     VITALS: /81   Pulse 59   Ht 162.6 cm (64\")   Wt 63.5 kg (140 lb)   SpO2 98%   BMI 24.03 kg/m²     LABS:   No visits with results within 3 Month(s) from this visit.   Latest known visit with results is:   Lab on 08/29/2023   Component Date Value Ref Range Status    Glucose 08/29/2023 98  65 - 99 mg/dL Final    BUN 08/29/2023 37 (H)  8 - 23 mg/dL Final    Creatinine 08/29/2023 1.52 (H)  0.57 - 1.00 mg/dL Final    Sodium 08/29/2023 137  136 - 145 mmol/L Final    Potassium 08/29/2023 4.1  3.5 - 5.2 mmol/L Final    Chloride 08/29/2023 100  98 - 107 mmol/L Final    CO2 08/29/2023 25.1  22.0 - 29.0 mmol/L Final    Calcium 08/29/2023 9.6  8.6 - 10.5 mg/dL Final    BUN/Creatinine Ratio 08/29/2023 24.3  7.0 - 25.0 Final    Anion Gap 08/29/2023 11.9  5.0 - 15.0 mmol/L Final    eGFR 08/29/2023 34.5 (L)  >60.0 mL/min/1.73 Final    Magnesium 08/29/2023 2.3  1.6 - 2.4 mg/dL Final         IMAGING:   No Images in the past 120 days found..  No results found for this or any previous visit.     No results found for this or any previous visit.          EXAM:  Constitutional:       General: Awake.      Appearance: Healthy appearance. Not in distress.   Eyes:      Conjunctiva/sclera: Conjunctivae normal.   HENT:      Head: Normocephalic and atraumatic.      Nose: Nose normal.    " Mouth/Throat:      Pharynx: Oropharynx is clear.   Neck:      Thyroid: Thyroid normal.      Vascular: No carotid bruit or JVD.   Pulmonary:      Effort: Pulmonary effort is normal.      Breath sounds: Normal breath sounds.   Chest:      Chest wall: Not tender to palpatation.   Cardiovascular:      PMI at left midclavicular line. Normal rate. Regular rhythm. Normal S1 with normal intensity. Normal S2 with normal intensity.       Murmurs: There is no murmur.      No gallop.  No rub.   Pulses:     Intact distal pulses.   Edema:     Peripheral edema absent.   Abdominal:      General: Bowel sounds are normal. There is no distension.      Palpations: Abdomen is soft. There is no hepatomegaly.      Tenderness: There is no abdominal tenderness.   Musculoskeletal: Normal range of motion.      Cervical back: Normal range of motion. Skin:     General: Skin is warm and dry. There is no cyanosis.      Coloration: Skin is not jaundiced.   Neurological:      Mental Status: Alert and oriented to person, place and time.      Motor: Motor function is intact.      Gait: Gait is intact.   Psychiatric:         Attention and Perception: Attention and perception normal.         Speech: Speech normal.         Behavior: Behavior is cooperative.         Cognition and Memory: Cognition and memory normal.         Judgment: Judgment normal.          Procedure   Procedures       Assessment & Plan     Diagnoses and all orders for this visit:    1. Renovascular hypertension (Primary)  -     Stress Test With Myocardial Perfusion One Day  -     Cardiac Event Monitor (FERNIE) or Mobile Cardiac Outpatient Telemetry (MCT)  -     Adult Transthoracic Echo Complete W/ Cont if Necessary Per Protocol    2. Mixed hyperlipidemia  -     Stress Test With Myocardial Perfusion One Day  -     Cardiac Event Monitor (FERNIE) or Mobile Cardiac Outpatient Telemetry (MCT)  -     Adult Transthoracic Echo Complete W/ Cont if Necessary Per Protocol    3. Excessive daytime  sleepiness  -     Ambulatory Referral to Sleep Medicine    4. Acute coronary microvascular dysfunction  -     Stress Test With Myocardial Perfusion One Day    5. Syncope and collapse  -     Cardiac Event Monitor (FERNIE) or Mobile Cardiac Outpatient Telemetry (MCT)    6. Coronary artery disease involving native coronary artery of native heart without angina pectoris    Other orders  -     losartan (Cozaar) 50 MG tablet; Take 1 tablet by mouth Daily.  Dispense: 30 tablet; Refill: 12          PLAN  #1 cardiac with patient with history of coronary disease with stents done in August 2023.  Continue dual endplate therapy for now.  Patient having some angina symptom and will get a stress and echo done.  2 palpitations.  Patient been experiencing bradycardia and dizziness lightheadedness with presyncope recently also.  Will get a event monitor placed.  May need permanent pacemaker  #3 hypertension.  Patient blood pressure is running high.  Will increase losartan need systolic pressure less than 139 diastolic less than 80.  #4 hyperlipidemia.  Continue lipid-lowering medication  #5 excessive daytime sleepiness.  Will request sleep study           MEDS ORDERED DURING VISIT:    Medications Discontinued During This Encounter   Medication Reason    aspirin 81 MG EC tablet *Therapy completed    furosemide (LASIX) 20 MG tablet *Therapy completed    losartan (Cozaar) 25 MG tablet *Therapy completed        New Medications Ordered This Visit   Medications    losartan (Cozaar) 50 MG tablet     Sig: Take 1 tablet by mouth Daily.     Dispense:  30 tablet     Refill:  12         Follow Up:   Return in about 6 weeks (around 1/13/2025) for Recheck.    Patient was given instructions and counseling regarding her condition or for health maintenance advice. Please see specific information pulled into the AVS if appropriate.   As always, Joseph Vann MD  I appreciate very much the opportunity to participate in the cardiovascular care of your  patients. Please do not hesitate to call me with any questions with regards to Amanda Cruz evaluation and management.         This document has been electronically signed by Phu Ramírez MD [facility], Interventional Cardiology  December 2, 2024 15:10 EST    Dictated Utilizing Dragon Dictation: Part of this note may be an electronic transcription/translation of spoken language to printed text using the Dragon Dictation System.

## 2024-12-16 ENCOUNTER — TELEPHONE (OUTPATIENT)
Dept: CARDIOLOGY | Facility: CLINIC | Age: 84
End: 2024-12-16
Payer: MEDICARE

## 2024-12-16 NOTE — TELEPHONE ENCOUNTER
Patient called in and states she is out of electrodes for her cardiac event monitor. She states the last one she has is not making good skin contact. I called [employer] for her and ordered them they will be sent to her sisters address. Sister is aware and will make sure patient gets them

## 2024-12-20 ENCOUNTER — TELEPHONE (OUTPATIENT)
Dept: CARDIOLOGY | Facility: CLINIC | Age: 84
End: 2024-12-20
Payer: MEDICARE

## 2024-12-20 NOTE — TELEPHONE ENCOUNTER
Patient has ordered more application stickers for Cardiac Event Monitor and is putting device back on today.

## 2024-12-29 LAB
CV ZIO BASELINE AVG BPM: 56
CV ZIO BASELINE BPM HIGH: 117
CV ZIO BASELINE BPM LOW: 37

## (undated) DEVICE — TUBING, SUCTION, 1/4" X 20', STRAIGHT: Brand: MEDLINE INDUSTRIES, INC.

## (undated) DEVICE — SYR LUERLOK 30CC

## (undated) DEVICE — LN INJ CONTRST FLXCIL HP F/M LL 1200PSI10

## (undated) DEVICE — LN FLTR ORL/NASL MICROSTREAM NONINTUB A/LNG

## (undated) DEVICE — TREK CORONARY DILATATION CATHETER 2.25 MM X 8 MM / RAPID-EXCHANGE: Brand: TREK

## (undated) DEVICE — PINNACLE INTRODUCER SHEATH: Brand: PINNACLE

## (undated) DEVICE — DRSNG SURESITE WNDW 4X4.5

## (undated) DEVICE — Device: Brand: ASAHI SION BLUE

## (undated) DEVICE — SINGLE PORT MANIFOLD: Brand: NEPTUNE 2

## (undated) DEVICE — GOWN,REINF,POLY,ECL,PP SLV,XL: Brand: MEDLINE

## (undated) DEVICE — DEV INFL MONARCH 25W

## (undated) DEVICE — THE BITE BLOCK MAXI, LATEX FREE STRAP IS USED TO PROTECT THE ENDOSCOPE INSERTION TUBE FROM BEING BITTEN BY THE PATIENT.

## (undated) DEVICE — ASMBL SPK CONTRST CONTRL

## (undated) DEVICE — Device: Brand: MEDEX

## (undated) DEVICE — PK CATH CARD 70

## (undated) DEVICE — 6F .070 3 DRC 100CM: Brand: VISTA BRITE TIP

## (undated) DEVICE — CATH F5 INF 3DRC 100CM: Brand: INFINITI

## (undated) DEVICE — ANGIO-SEAL VIP VASCULAR CLOSURE DEVICE: Brand: ANGIO-SEAL

## (undated) DEVICE — ST EXT IV SMRTSTE 2VLV FIX M LL 6ML 41

## (undated) DEVICE — PAD, DEFIB, ADULT, RADIOTRANS, ZOLL: Brand: MEDLINE

## (undated) DEVICE — 360 - 360I 10"/10" CMSQ 8RA: Brand: MEDLINE

## (undated) DEVICE — GW J/TP MOVE/CORE 0.035 3MM/TP 145CM

## (undated) DEVICE — FRCP BX RADJAW4 NDL 2.8 240CM LG OG BX40

## (undated) DEVICE — 6F .070 XB LAD 3.5 100CM: Brand: VISTA BRITE TIP

## (undated) DEVICE — Device: Brand: DEFENDO AIR/WATER/SUCTION AND BIOPSY VALVE

## (undated) DEVICE — MANIFLD NAMIC PRECEPTOR INTEGR/TRANSD RT/HND 1/PRT 500PSI

## (undated) DEVICE — Device

## (undated) DEVICE — ST INF PRI SMRTSTE 20DRP 2VLV 24ML 117